# Patient Record
Sex: FEMALE | Race: BLACK OR AFRICAN AMERICAN | Employment: OTHER | URBAN - METROPOLITAN AREA
[De-identification: names, ages, dates, MRNs, and addresses within clinical notes are randomized per-mention and may not be internally consistent; named-entity substitution may affect disease eponyms.]

---

## 2021-06-30 ENCOUNTER — APPOINTMENT (OUTPATIENT)
Dept: GENERAL RADIOLOGY | Age: 66
DRG: 871 | End: 2021-06-30
Attending: NURSE PRACTITIONER
Payer: MEDICARE

## 2021-06-30 ENCOUNTER — APPOINTMENT (OUTPATIENT)
Dept: CT IMAGING | Age: 66
DRG: 871 | End: 2021-06-30
Attending: NURSE PRACTITIONER
Payer: MEDICARE

## 2021-06-30 ENCOUNTER — HOSPITAL ENCOUNTER (INPATIENT)
Age: 66
LOS: 13 days | Discharge: HOME HEALTH | DRG: 871 | End: 2021-07-13
Attending: STUDENT IN AN ORGANIZED HEALTH CARE EDUCATION/TRAINING PROGRAM | Admitting: FAMILY MEDICINE
Payer: MEDICARE

## 2021-06-30 DIAGNOSIS — N17.9 ACUTE KIDNEY INJURY (HCC): ICD-10-CM

## 2021-06-30 DIAGNOSIS — A41.9 SEVERE SEPSIS (HCC): Primary | ICD-10-CM

## 2021-06-30 DIAGNOSIS — N39.0 URINARY TRACT INFECTION WITH HEMATURIA, SITE UNSPECIFIED: ICD-10-CM

## 2021-06-30 DIAGNOSIS — R41.0 DELIRIUM: ICD-10-CM

## 2021-06-30 DIAGNOSIS — R65.20 SEVERE SEPSIS (HCC): Primary | ICD-10-CM

## 2021-06-30 DIAGNOSIS — R31.9 URINARY TRACT INFECTION WITH HEMATURIA, SITE UNSPECIFIED: ICD-10-CM

## 2021-06-30 LAB
ABO + RH BLD: NORMAL
ALBUMIN SERPL-MCNC: 3 G/DL (ref 3.5–5)
ALBUMIN/GLOB SERPL: 0.6 {RATIO} (ref 1.1–2.2)
ALP SERPL-CCNC: 86 U/L (ref 45–117)
ALT SERPL-CCNC: 20 U/L (ref 12–78)
AMPHET UR QL SCN: NEGATIVE
ANION GAP SERPL CALC-SCNC: 16 MMOL/L (ref 5–15)
APAP SERPL-MCNC: <2 UG/ML (ref 10–30)
APPEARANCE UR: ABNORMAL
AST SERPL-CCNC: 22 U/L (ref 15–37)
BACTERIA URNS QL MICRO: ABNORMAL /HPF
BARBITURATES UR QL SCN: NEGATIVE
BASOPHILS # BLD: 0 K/UL (ref 0–0.1)
BASOPHILS NFR BLD: 0 % (ref 0–1)
BENZODIAZ UR QL: NEGATIVE
BILIRUB SERPL-MCNC: 0.7 MG/DL (ref 0.2–1)
BILIRUB UR QL CFM: NEGATIVE
BLOOD GROUP ANTIBODIES SERPL: NORMAL
BUN SERPL-MCNC: 45 MG/DL (ref 6–20)
BUN/CREAT SERPL: 16 (ref 12–20)
CALCIUM SERPL-MCNC: 10.1 MG/DL (ref 8.5–10.1)
CANNABINOIDS UR QL SCN: NEGATIVE
CHLORIDE SERPL-SCNC: 98 MMOL/L (ref 97–108)
CK SERPL-CCNC: 328 U/L (ref 26–192)
CO2 SERPL-SCNC: 24 MMOL/L (ref 21–32)
COCAINE UR QL SCN: NEGATIVE
COLOR UR: ABNORMAL
COVID-19 RAPID TEST, COVR: NOT DETECTED
CREAT SERPL-MCNC: 2.81 MG/DL (ref 0.55–1.02)
DIFFERENTIAL METHOD BLD: ABNORMAL
DRUG SCRN COMMENT,DRGCM: NORMAL
EOSINOPHIL # BLD: 0 K/UL (ref 0–0.4)
EOSINOPHIL NFR BLD: 0 % (ref 0–7)
EPITH CASTS URNS QL MICRO: ABNORMAL /LPF
ERYTHROCYTE [DISTWIDTH] IN BLOOD BY AUTOMATED COUNT: 14.2 % (ref 11.5–14.5)
ETHANOL SERPL-MCNC: <10 MG/DL
GLOBULIN SER CALC-MCNC: 4.9 G/DL (ref 2–4)
GLUCOSE BLD STRIP.AUTO-MCNC: 248 MG/DL (ref 65–117)
GLUCOSE SERPL-MCNC: 238 MG/DL (ref 65–100)
GLUCOSE UR STRIP.AUTO-MCNC: NEGATIVE MG/DL
HCT VFR BLD AUTO: 34 % (ref 35–47)
HGB BLD-MCNC: 11.1 G/DL (ref 11.5–16)
HGB UR QL STRIP: ABNORMAL
IMM GRANULOCYTES # BLD AUTO: 0.3 K/UL
IMM GRANULOCYTES NFR BLD AUTO: 1 %
INR PPP: 1 (ref 0.9–1.1)
KETONES UR QL STRIP.AUTO: ABNORMAL MG/DL
LACTATE SERPL-SCNC: 2.4 MMOL/L (ref 0.4–2)
LEUKOCYTE ESTERASE UR QL STRIP.AUTO: ABNORMAL
LIPASE SERPL-CCNC: 55 U/L (ref 73–393)
LYMPHOCYTES # BLD: 0.5 K/UL (ref 0.8–3.5)
LYMPHOCYTES NFR BLD: 2 % (ref 12–49)
MAGNESIUM SERPL-MCNC: 2.1 MG/DL (ref 1.6–2.4)
MCH RBC QN AUTO: 27.3 PG (ref 26–34)
MCHC RBC AUTO-ENTMCNC: 32.6 G/DL (ref 30–36.5)
MCV RBC AUTO: 83.5 FL (ref 80–99)
METHADONE UR QL: NEGATIVE
MONOCYTES # BLD: 1.5 K/UL (ref 0–1)
MONOCYTES NFR BLD: 6 % (ref 5–13)
NEUTS BAND NFR BLD MANUAL: 15 % (ref 0–6)
NEUTS SEG # BLD: 23.1 K/UL (ref 1.8–8)
NEUTS SEG NFR BLD: 76 % (ref 32–75)
NITRITE UR QL STRIP.AUTO: NEGATIVE
NRBC # BLD: 0 K/UL (ref 0–0.01)
NRBC BLD-RTO: 0 PER 100 WBC
OPIATES UR QL: NEGATIVE
PCP UR QL: NEGATIVE
PH UR STRIP: 5.5 [PH] (ref 5–8)
PLATELET # BLD AUTO: 250 K/UL (ref 150–400)
PMV BLD AUTO: 10.3 FL (ref 8.9–12.9)
POTASSIUM SERPL-SCNC: 3.6 MMOL/L (ref 3.5–5.1)
PROT SERPL-MCNC: 7.9 G/DL (ref 6.4–8.2)
PROT UR STRIP-MCNC: 100 MG/DL
PROTHROMBIN TIME: 10.3 SEC (ref 9–11.1)
RBC # BLD AUTO: 4.07 M/UL (ref 3.8–5.2)
RBC #/AREA URNS HPF: ABNORMAL /HPF (ref 0–5)
RBC MORPH BLD: ABNORMAL
SALICYLATES SERPL-MCNC: 4.8 MG/DL (ref 2.8–20)
SERVICE CMNT-IMP: ABNORMAL
SODIUM SERPL-SCNC: 138 MMOL/L (ref 136–145)
SOURCE, COVRS: NORMAL
SP GR UR REFRACTOMETRY: 1.02 (ref 1–1.03)
SPECIMEN EXP DATE BLD: NORMAL
TROPONIN I SERPL-MCNC: <0.05 NG/ML
TSH SERPL DL<=0.05 MIU/L-ACNC: 0.31 UIU/ML (ref 0.36–3.74)
UR CULT HOLD, URHOLD: NORMAL
UROBILINOGEN UR QL STRIP.AUTO: 1 EU/DL (ref 0.2–1)
WBC # BLD AUTO: 25.4 K/UL (ref 3.6–11)
WBC URNS QL MICRO: ABNORMAL /HPF (ref 0–4)

## 2021-06-30 PROCEDURE — 80307 DRUG TEST PRSMV CHEM ANLYZR: CPT

## 2021-06-30 PROCEDURE — 36415 COLL VENOUS BLD VENIPUNCTURE: CPT

## 2021-06-30 PROCEDURE — 87186 SC STD MICRODIL/AGAR DIL: CPT

## 2021-06-30 PROCEDURE — 70450 CT HEAD/BRAIN W/O DYE: CPT

## 2021-06-30 PROCEDURE — 83036 HEMOGLOBIN GLYCOSYLATED A1C: CPT

## 2021-06-30 PROCEDURE — 83735 ASSAY OF MAGNESIUM: CPT

## 2021-06-30 PROCEDURE — 85025 COMPLETE CBC W/AUTO DIFF WBC: CPT

## 2021-06-30 PROCEDURE — 74011250637 HC RX REV CODE- 250/637: Performed by: NURSE PRACTITIONER

## 2021-06-30 PROCEDURE — 82962 GLUCOSE BLOOD TEST: CPT

## 2021-06-30 PROCEDURE — 80179 DRUG ASSAY SALICYLATE: CPT

## 2021-06-30 PROCEDURE — 99285 EMERGENCY DEPT VISIT HI MDM: CPT

## 2021-06-30 PROCEDURE — 83605 ASSAY OF LACTIC ACID: CPT

## 2021-06-30 PROCEDURE — 84443 ASSAY THYROID STIM HORMONE: CPT

## 2021-06-30 PROCEDURE — 74011000258 HC RX REV CODE- 258: Performed by: NURSE PRACTITIONER

## 2021-06-30 PROCEDURE — 74011250636 HC RX REV CODE- 250/636: Performed by: NURSE PRACTITIONER

## 2021-06-30 PROCEDURE — 82077 ASSAY SPEC XCP UR&BREATH IA: CPT

## 2021-06-30 PROCEDURE — 80053 COMPREHEN METABOLIC PANEL: CPT

## 2021-06-30 PROCEDURE — 87086 URINE CULTURE/COLONY COUNT: CPT

## 2021-06-30 PROCEDURE — 87635 SARS-COV-2 COVID-19 AMP PRB: CPT

## 2021-06-30 PROCEDURE — 81001 URINALYSIS AUTO W/SCOPE: CPT

## 2021-06-30 PROCEDURE — 85610 PROTHROMBIN TIME: CPT

## 2021-06-30 PROCEDURE — 87040 BLOOD CULTURE FOR BACTERIA: CPT

## 2021-06-30 PROCEDURE — 71045 X-RAY EXAM CHEST 1 VIEW: CPT

## 2021-06-30 PROCEDURE — 72125 CT NECK SPINE W/O DYE: CPT

## 2021-06-30 PROCEDURE — 82550 ASSAY OF CK (CPK): CPT

## 2021-06-30 PROCEDURE — 86901 BLOOD TYPING SEROLOGIC RH(D): CPT

## 2021-06-30 PROCEDURE — 65610000006 HC RM INTENSIVE CARE

## 2021-06-30 PROCEDURE — 96365 THER/PROPH/DIAG IV INF INIT: CPT

## 2021-06-30 PROCEDURE — 83690 ASSAY OF LIPASE: CPT

## 2021-06-30 PROCEDURE — 84484 ASSAY OF TROPONIN QUANT: CPT

## 2021-06-30 PROCEDURE — 80143 DRUG ASSAY ACETAMINOPHEN: CPT

## 2021-06-30 PROCEDURE — 93005 ELECTROCARDIOGRAM TRACING: CPT

## 2021-06-30 PROCEDURE — 87077 CULTURE AEROBIC IDENTIFY: CPT

## 2021-06-30 PROCEDURE — 84145 PROCALCITONIN (PCT): CPT

## 2021-06-30 RX ORDER — ACETAMINOPHEN 650 MG/1
975 SUPPOSITORY RECTAL
Status: COMPLETED | OUTPATIENT
Start: 2021-06-30 | End: 2021-06-30

## 2021-06-30 RX ADMIN — SODIUM CHLORIDE, POTASSIUM CHLORIDE, SODIUM LACTATE AND CALCIUM CHLORIDE 1000 ML: 600; 310; 30; 20 INJECTION, SOLUTION INTRAVENOUS at 18:55

## 2021-06-30 RX ADMIN — ACETAMINOPHEN 975 MG: 650 SUPPOSITORY RECTAL at 20:00

## 2021-06-30 RX ADMIN — SODIUM CHLORIDE, POTASSIUM CHLORIDE, SODIUM LACTATE AND CALCIUM CHLORIDE 1000 ML: 600; 310; 30; 20 INJECTION, SOLUTION INTRAVENOUS at 18:56

## 2021-06-30 RX ADMIN — CEFTRIAXONE SODIUM 2 G: 2 INJECTION, POWDER, FOR SOLUTION INTRAMUSCULAR; INTRAVENOUS at 21:22

## 2021-06-30 NOTE — ED PROVIDER NOTES
The below HPI was taken from the patient's son, Meg Cordoba. According Meg Cordoba, she went to check in on his mother on Sunday 6/27/21 and noticed that she seemed \"a little bit off\" which she articulates as a little bit restless, confused, and just not acting herself. She also stated that she had a little bit of a cough. He subsequently visited her the 2 following days and reports progression of her bizarre behavior. He states that he went to go visit her today she was completely disoriented and was not making any sense. He states that the patient is normally A&O x4 at baseline and can complete all of her ADLs and IADLs. PMHx includes: breast CA, diabetes, hypertension, Parkinson's disease, and PUD. Past Medical History:   Diagnosis Date    Cancer ([address] 2010, 2015    BREAST right X2    Diabetes ([facility]     TYPE II    Headache     AWOKE WITH HEADACHE TODAY; HAS HEADACHE ALMOST EVERY DAY, SHE STATES; RELIEVED BY TYLENOL.     Hypertension     Parkinsonism ([address]     Psychiatric disorder     TAKES RISPERIDONE, PT STATES, FOR \"MOOD\"    PUD (peptic ulcer disease)     peptic ulcer; no bleeding    Shortness of breath        Past Surgical History:   Procedure Laterality Date    HX BREAST LUMPECTOMY Left 2010, 2016     BREAST    HX BREAST RECONSTRUCTION Left 6/8/2016    REMOVE TISSUE EXPANDER,PLACEMENT OF IMPLANT LEFT BREAST,REVISE LEFT RECONSTRUCTED BREAST,LIPOSUCTION OF LEFT BREAST, RIGHT MASTOPEXY FOR SYMMETRY performed by Savana Portillo MD at [address] [place] [address] HX BREAST RECONSTRUCTION Left 10/5/2016    LEFT NIPPLE AEROLA COMPLEX RECONSTRUCTION WITH LEFT GROIN SKIN GRAFT, EXCISION TISSUE LEFT BREAST  performed by Savana Portillo MD at [place] [address]    HX HYSTERECTOMY      TOTAL    HX OTHER SURGICAL      SEBACEOUS CYST REMOVAL UPPER BACK         Family History:   Problem Relation Age of Onset    Heart Disease Mother     Pacemaker Mother     Alzheimer Father     No Known Problems Sister    [facility] Stroke Brother     No Known Problems Sister     No Known Problems Sister     No Known Problems Sister     No Known Problems Sister     No Known Problems Brother     No Known Problems Brother     Anesth Problems Neg Hx        Social History     Socioeconomic History    Marital status:      Spouse name: Not on file    Number of children: Not on file    Years of education: Not on file    Highest education level: Not on file   Occupational History    Not on file   Tobacco Use    Smoking status: Former Smoker    Smokeless tobacco: Never Used    Tobacco comment: WAS OCC. SMOKER WHEN SMOKED; QUIT 5 YEARS AGO OR SO, SHE STATES ON 9/21/16. Substance and Sexual Activity    Alcohol use: Yes     Comment: socially    Drug use: No    Sexual activity: Not on file   Other Topics Concern    Not on file   Social History Narrative    Not on file     Social Determinants of Health     Financial Resource Strain:     Difficulty of Paying Living Expenses:    Food Insecurity:     Worried About Running Out of Food in the Last Year:     [address] in the Last Year:    Transportation Needs:     Lack of Transportation (Medical):  Lack of Transportation (Non-Medical):    Physical Activity:     Days of Exercise per Week:     Minutes of Exercise per Session:    Stress:     Feeling of Stress :    Social Connections:     Frequency of Communication with Friends and Family:     Frequency of Social Gatherings with Friends and Family:     Attends Nondenominational Services:     Active Member of Clubs or Organizations:     Attends Club or Organization Meetings:     Marital Status:    Intimate Partner Violence:     Fear of Current or Ex-Partner:     Emotionally Abused:     Physically Abused:     Sexually Abused: ALLERGIES: Patient has no known allergies.     Review of Systems   Unable to perform ROS: Mental status change       Vitals:    06/30/21 1836 06/30/21 1838 06/30/21 1845   BP:  (!) 144/92 Pulse:  (!) 110 (!) 112   Resp:  19 (!) 33   Temp: (!) 102 °F (38.9 °C)     SpO2:  96% 96%            Physical Exam  Vitals and nursing note reviewed. Constitutional:       Appearance: Normal appearance. She is obese. She is ill-appearing. She is not diaphoretic. HENT:      Head: Normocephalic and atraumatic. Right Ear: External ear normal.      Left Ear: External ear normal.      Nose: Nose normal.      Mouth/Throat:      Mouth: Mucous membranes are dry. Pharynx: Oropharynx is clear. Eyes:      General: No scleral icterus. Extraocular Movements: Extraocular movements intact. Conjunctiva/sclera: Conjunctivae normal.      Pupils: Pupils are equal, round, and reactive to light. Neck:      Comments: No grimacing or objective signs of pain from passive neck movment  Cardiovascular:      Rate and Rhythm: Regular rhythm. Tachycardia present. Pulses: Normal pulses. Radial pulses are 2+ on the right side and 2+ on the left side. Heart sounds: Murmur heard. Pulmonary:      Effort: Tachypnea present. No respiratory distress. Breath sounds: Decreased breath sounds present. Abdominal:      General: Abdomen is flat. Bowel sounds are normal. There is no distension. Palpations: Abdomen is soft. Tenderness: There is no abdominal tenderness. There is no guarding or rebound. Musculoskeletal:         General: Normal range of motion. Cervical back: Normal range of motion and neck supple. No edema, erythema or rigidity. Right lower leg: No edema. Left lower leg: No edema. Skin:     General: Skin is dry. Coloration: Skin is not pale. Comments: Skin is hot to touch   Neurological:      Mental Status: She is lethargic and confused. Psychiatric:         Mood and Affect: Mood normal.         Behavior: Behavior normal.         Thought Content:  Thought content normal.         Judgment: Judgment normal.          MDM      VITAL SIGNS:  Patient Vitals for the past 4 hrs:   Temp Pulse Resp BP SpO2   06/30/21 2029  100 20 107/68 97 %   06/30/21 1908 (!) 101.8 °F (38.8 °C)       06/30/21 1845  (!) 112 (!) 33  96 %   06/30/21 1838  (!) 110 19 (!) 144/92 96 %   06/30/21 1836 (!) 102 °F (38.9 °C)             LABS:  Recent Results (from the past 6 hour(s))   EKG, 12 LEAD, INITIAL    Collection Time: 06/30/21  6:42 PM   Result Value Ref Range    Ventricular Rate 110 BPM    Atrial Rate 110 BPM    P-R Interval 144 ms    QRS Duration 76 ms    Q-T Interval 254 ms    QTC Calculation (Bezet) 343 ms    Calculated P Axis 53 degrees    Calculated R Axis 3 degrees    Calculated T Axis 93 degrees    Diagnosis       Sinus tachycardia  Nonspecific T wave abnormality  Abnormal ECG  When compared with ECG of 21-SEP-2016 10:52,  No significant change was found     GLUCOSE, POC    Collection Time: 06/30/21  6:55 PM   Result Value Ref Range    Glucose (POC) 248 (H) 65 - 117 mg/dL    Performed by Nasreen Ramirez    CBC WITH AUTOMATED DIFF    Collection Time: 06/30/21  6:58 PM   Result Value Ref Range    WBC 25.4 (H) 3.6 - 11.0 K/uL    RBC 4.07 3.80 - 5.20 M/uL    HGB 11.1 (L) 11.5 - 16.0 g/dL    HCT 34.0 (L) 35.0 - 47.0 %    MCV 83.5 80.0 - 99.0 FL    MCH 27.3 26.0 - 34.0 PG    MCHC 32.6 30.0 - 36.5 g/dL    RDW 14.2 11.5 - 14.5 %    PLATELET 861 635 - 234 K/uL    MPV 10.3 8.9 - 12.9 FL    NRBC 0.0 0  WBC    ABSOLUTE NRBC 0.00 0.00 - 0.01 K/uL    NEUTROPHILS PENDING %    LYMPHOCYTES PENDING %    MONOCYTES PENDING %    EOSINOPHILS PENDING %    BASOPHILS PENDING %    IMMATURE GRANULOCYTES PENDING %    ABS. NEUTROPHILS PENDING K/UL    ABS. LYMPHOCYTES PENDING K/UL    ABS. MONOCYTES PENDING K/UL    ABS. EOSINOPHILS PENDING K/UL    ABS. BASOPHILS PENDING K/UL    ABS. IMM. GRANS.  PENDING K/UL    DF PENDING    METABOLIC PANEL, COMPREHENSIVE    Collection Time: 06/30/21  6:58 PM   Result Value Ref Range    Sodium 138 136 - 145 mmol/L    Potassium 3.6 3.5 - 5.1 mmol/L Chloride 98 97 - 108 mmol/L    CO2 24 21 - 32 mmol/L    Anion gap 16 (H) 5 - 15 mmol/L    Glucose 238 (H) 65 - 100 mg/dL    BUN 45 (H) 6 - 20 MG/DL    Creatinine 2.81 (H) 0.55 - 1.02 MG/DL    BUN/Creatinine ratio 16 12 - 20      GFR est AA 20 (L) >60 ml/min/1.73m2    GFR est non-AA 17 (L) >60 ml/min/1.73m2    Calcium 10.1 8.5 - 10.1 MG/DL    Bilirubin, total 0.7 0.2 - 1.0 MG/DL    ALT (SGPT) 20 12 - 78 U/L    AST (SGOT) 22 15 - 37 U/L    Alk. phosphatase 86 45 - 117 U/L    Protein, total 7.9 6.4 - 8.2 g/dL    Albumin 3.0 (L) 3.5 - 5.0 g/dL    Globulin 4.9 (H) 2.0 - 4.0 g/dL    A-G Ratio 0.6 (L) 1.1 - 2.2     URINALYSIS W/MICROSCOPIC    Collection Time: 06/30/21  6:58 PM   Result Value Ref Range    Color YELLOW/STRAW      Appearance CLOUDY (A) CLEAR      Specific gravity 1.025 1.003 - 1.030      pH (UA) 5.5 5.0 - 8.0      Protein 100 (A) NEG mg/dL    Glucose Negative NEG mg/dL    Ketone TRACE (A) NEG mg/dL    Blood LARGE (A) NEG      Urobilinogen 1.0 0.2 - 1.0 EU/dL    Nitrites Negative NEG      Leukocyte Esterase MODERATE (A) NEG      WBC  0 - 4 /hpf    RBC 0-5 0 - 5 /hpf    Epithelial cells FEW FEW /lpf    Bacteria 3+ (A) NEG /hpf   URINE CULTURE HOLD SAMPLE    Collection Time: 06/30/21  6:58 PM    Specimen: Serum; Urine   Result Value Ref Range    Urine culture hold        Urine on hold in Microbiology dept for 2 days. If unpreserved urine is submitted, it cannot be used for addtional testing after 24 hours, recollection will be required.    PROTHROMBIN TIME + INR    Collection Time: 06/30/21  6:58 PM   Result Value Ref Range    INR 1.0 0.9 - 1.1      Prothrombin time 10.3 9.0 - 11.1 sec   TROPONIN I    Collection Time: 06/30/21  6:58 PM   Result Value Ref Range    Troponin-I, Qt. <0.05 <0.05 ng/mL   ETHYL ALCOHOL    Collection Time: 06/30/21  6:58 PM   Result Value Ref Range    ALCOHOL(ETHYL),SERUM <10 <10 MG/DL   MAGNESIUM    Collection Time: 06/30/21  6:58 PM   Result Value Ref Range    Magnesium 2.1 1.6 - 2.4 mg/dL   SALICYLATE    Collection Time: 06/30/21  6:58 PM   Result Value Ref Range    Salicylate level 4.8 2.8 - 20.0 MG/DL   ACETAMINOPHEN    Collection Time: 06/30/21  6:58 PM   Result Value Ref Range    Acetaminophen level <2 (L) 10 - 30 ug/mL   LACTIC ACID    Collection Time: 06/30/21  6:58 PM   Result Value Ref Range    Lactic acid 2.4 (HH) 0.4 - 2.0 MMOL/L   LIPASE    Collection Time: 06/30/21  6:58 PM   Result Value Ref Range    Lipase 55 (L) 73 - 393 U/L   TSH 3RD GENERATION    Collection Time: 06/30/21  6:58 PM   Result Value Ref Range    TSH 0.31 (L) 0.36 - 3.74 uIU/mL   BILIRUBIN, CONFIRM    Collection Time: 06/30/21  6:58 PM   Result Value Ref Range    Bilirubin UA, confirm Negative NEG     COVID-19 RAPID TEST    Collection Time: 06/30/21  7:06 PM   Result Value Ref Range    Specimen source Nasopharyngeal      COVID-19 rapid test Not detected NOTD          IMAGING:  CT HEAD WO CONT   Final Result   No evidence of acute process. CT SPINE CERV WO CONT   Final Result   No evidence of acute process. XR CHEST PORT   Final Result   No acute cardiopulmonary process. Medications During Visit:  Medications   cefTRIAXone (ROCEPHIN) 2 g in 0.9% sodium chloride (MBP/ADV) 50 mL MBP (has no administration in time range)   lactated ringers bolus infusion 1,000 mL (1,000 mL IntraVENous New Bag 6/30/21 1856)   lactated ringers bolus infusion 1,000 mL (1,000 mL IntraVENous New Bag 6/30/21 1855)   acetaminophen (TYLENOL) suppository 975 mg (975 mg Rectal Given 6/30/21 2000)         DECISION MAKING:  Marcelle Shane is a 74 y.o. female who comes in as above. Work-up significant for marked leukocytosis, lactic acidosis, and evidence of sepsis secondary to UTI. Patient remains normotensive and still has 2 L IVF infusing. 1 g ceftriaxone given and UC pending. Negative chest x-ray + CT scan of head and neck. Will admit to medicine for further management.     Perfect Serve Consult for Admission  8:27 PM    ED Room Number: SER08/08  Patient Name and age:  Bertha Everett 74 y.o.  female  Working Diagnosis:   1. Severe sepsis ([facility]    2. Acute kidney injury ([facility]    3. Delirium    4. Urinary tract infection with hematuria, site unspecified        COVID-19 Suspicion:  no  Sepsis present:  yes  Reassessment needed: yes  Code Status:  Full Code  Readmission: no  Isolation Requirements:  no  Recommended Level of Care:  step down  Department:[facility] Adult ED - 21   Other: Severe sepsis secondary to UTI and delirium      IMPRESSION:  1. Severe sepsis ([facility] [address]    2. Acute kidney injury ([facility] [address]    3. Delirium    4. Urinary tract infection with hematuria, site unspecified          DISPOSITION:  Admitted    I have spent 30 minutes of critical care time involved in lab review, consultations with specialist, family decision- making, bedside attention and documentation. During this entire length of time I was immediately available to the patient .     Anitra Hay NP

## 2021-06-30 NOTE — Clinical Note
Status[de-identified] INPATIENT [101]   Type of Bed: Intensive Care [6]   Cardiac Monitoring Required?: No   Inpatient Hospitalization Certified Necessary for the Following Reasons: 3.  Patient receiving treatment that can only be provided in an inpatient setting (further clarification in H&P documentation)   Admitting Diagnosis: Sepsis [facility]) [[id number]]   Admitting Physician: Shemar Villa [[id number]]   Attending Physician: Shemar Villa [[id number]]   Estimated Length of Stay: 2 Midnights   Discharge Plan[de-identified] Home with Office Follow-up

## 2021-06-30 NOTE — ED TRIAGE NOTES
Patient arrives via EMS. Per EMS son states patient has been disoriented since Monday. Patient has not had anything to eat or drink in two days. Hx of DM and Hypertension    .

## 2021-07-01 LAB
ALBUMIN SERPL-MCNC: 2.4 G/DL (ref 3.5–5)
ALBUMIN/GLOB SERPL: 0.5 {RATIO} (ref 1.1–2.2)
ALP SERPL-CCNC: 106 U/L (ref 45–117)
ALT SERPL-CCNC: 20 U/L (ref 12–78)
ANION GAP SERPL CALC-SCNC: 6 MMOL/L (ref 5–15)
AST SERPL-CCNC: 22 U/L (ref 15–37)
ATRIAL RATE: 110 BPM
BILIRUB SERPL-MCNC: 0.5 MG/DL (ref 0.2–1)
BUN SERPL-MCNC: 43 MG/DL (ref 6–20)
BUN/CREAT SERPL: 17 (ref 12–20)
CALCIUM SERPL-MCNC: 9.9 MG/DL (ref 8.5–10.1)
CALCULATED P AXIS, ECG09: 53 DEGREES
CALCULATED R AXIS, ECG10: 3 DEGREES
CALCULATED T AXIS, ECG11: 93 DEGREES
CHLORIDE SERPL-SCNC: 102 MMOL/L (ref 97–108)
CO2 SERPL-SCNC: 29 MMOL/L (ref 21–32)
CREAT SERPL-MCNC: 2.56 MG/DL (ref 0.55–1.02)
DIAGNOSIS, 93000: NORMAL
EST. AVERAGE GLUCOSE BLD GHB EST-MCNC: 148 MG/DL
GLOBULIN SER CALC-MCNC: 4.8 G/DL (ref 2–4)
GLUCOSE BLD STRIP.AUTO-MCNC: 134 MG/DL (ref 65–117)
GLUCOSE BLD STRIP.AUTO-MCNC: 159 MG/DL (ref 65–117)
GLUCOSE SERPL-MCNC: 216 MG/DL (ref 65–100)
HBA1C MFR BLD: 6.8 % (ref 4–5.6)
LACTATE SERPL-SCNC: 1.6 MMOL/L (ref 0.4–2)
P-R INTERVAL, ECG05: 144 MS
POTASSIUM SERPL-SCNC: 3.4 MMOL/L (ref 3.5–5.1)
PROCALCITONIN SERPL-MCNC: 14.85 NG/ML
PROT SERPL-MCNC: 7.2 G/DL (ref 6.4–8.2)
Q-T INTERVAL, ECG07: 254 MS
QRS DURATION, ECG06: 76 MS
QTC CALCULATION (BEZET), ECG08: 343 MS
SERVICE CMNT-IMP: ABNORMAL
SERVICE CMNT-IMP: ABNORMAL
SODIUM SERPL-SCNC: 137 MMOL/L (ref 136–145)
VENTRICULAR RATE, ECG03: 110 BPM

## 2021-07-01 PROCEDURE — 74011636637 HC RX REV CODE- 636/637: Performed by: HOSPITALIST

## 2021-07-01 PROCEDURE — 74011250637 HC RX REV CODE- 250/637: Performed by: HOSPITALIST

## 2021-07-01 PROCEDURE — 97116 GAIT TRAINING THERAPY: CPT

## 2021-07-01 PROCEDURE — 83605 ASSAY OF LACTIC ACID: CPT

## 2021-07-01 PROCEDURE — 97165 OT EVAL LOW COMPLEX 30 MIN: CPT

## 2021-07-01 PROCEDURE — 74011250636 HC RX REV CODE- 250/636: Performed by: STUDENT IN AN ORGANIZED HEALTH CARE EDUCATION/TRAINING PROGRAM

## 2021-07-01 PROCEDURE — 74011000258 HC RX REV CODE- 258: Performed by: HOSPITALIST

## 2021-07-01 PROCEDURE — 82962 GLUCOSE BLOOD TEST: CPT

## 2021-07-01 PROCEDURE — 36415 COLL VENOUS BLD VENIPUNCTURE: CPT

## 2021-07-01 PROCEDURE — 74011250637 HC RX REV CODE- 250/637: Performed by: STUDENT IN AN ORGANIZED HEALTH CARE EDUCATION/TRAINING PROGRAM

## 2021-07-01 PROCEDURE — 97535 SELF CARE MNGMENT TRAINING: CPT

## 2021-07-01 PROCEDURE — 65660000001 HC RM ICU INTERMED STEPDOWN

## 2021-07-01 PROCEDURE — 74011250636 HC RX REV CODE- 250/636: Performed by: HOSPITALIST

## 2021-07-01 PROCEDURE — 80053 COMPREHEN METABOLIC PANEL: CPT

## 2021-07-01 PROCEDURE — 97161 PT EVAL LOW COMPLEX 20 MIN: CPT

## 2021-07-01 RX ORDER — POLYETHYLENE GLYCOL 3350 17 G/17G
17 POWDER, FOR SOLUTION ORAL DAILY PRN
Status: DISCONTINUED | OUTPATIENT
Start: 2021-07-01 | End: 2021-07-13 | Stop reason: HOSPADM

## 2021-07-01 RX ORDER — MAGNESIUM SULFATE 100 %
4 CRYSTALS MISCELLANEOUS AS NEEDED
Status: DISCONTINUED | OUTPATIENT
Start: 2021-07-01 | End: 2021-07-13 | Stop reason: HOSPADM

## 2021-07-01 RX ORDER — HEPARIN SODIUM 5000 [USP'U]/ML
5000 INJECTION, SOLUTION INTRAVENOUS; SUBCUTANEOUS EVERY 8 HOURS
Status: DISCONTINUED | OUTPATIENT
Start: 2021-07-01 | End: 2021-07-13 | Stop reason: HOSPADM

## 2021-07-01 RX ORDER — RISPERIDONE 1 MG/1
3 TABLET, FILM COATED ORAL 2 TIMES DAILY
Status: DISCONTINUED | OUTPATIENT
Start: 2021-07-01 | End: 2021-07-02

## 2021-07-01 RX ORDER — INSULIN LISPRO 100 [IU]/ML
INJECTION, SOLUTION INTRAVENOUS; SUBCUTANEOUS
Status: DISCONTINUED | OUTPATIENT
Start: 2021-07-01 | End: 2021-07-13 | Stop reason: HOSPADM

## 2021-07-01 RX ORDER — BENZTROPINE MESYLATE 1 MG/1
0.5 TABLET ORAL DAILY
Status: DISCONTINUED | OUTPATIENT
Start: 2021-07-01 | End: 2021-07-02

## 2021-07-01 RX ORDER — ACETAMINOPHEN 325 MG/1
650 TABLET ORAL
Status: DISCONTINUED | OUTPATIENT
Start: 2021-07-01 | End: 2021-07-13 | Stop reason: HOSPADM

## 2021-07-01 RX ORDER — DEXTROSE 50 % IN WATER (D50W) INTRAVENOUS SYRINGE
12.5-25 AS NEEDED
Status: DISCONTINUED | OUTPATIENT
Start: 2021-07-01 | End: 2021-07-13 | Stop reason: HOSPADM

## 2021-07-01 RX ORDER — ONDANSETRON 4 MG/1
4 TABLET, ORALLY DISINTEGRATING ORAL
Status: DISCONTINUED | OUTPATIENT
Start: 2021-07-01 | End: 2021-07-13 | Stop reason: HOSPADM

## 2021-07-01 RX ORDER — ONDANSETRON 2 MG/ML
4 INJECTION INTRAMUSCULAR; INTRAVENOUS
Status: DISCONTINUED | OUTPATIENT
Start: 2021-07-01 | End: 2021-07-13 | Stop reason: HOSPADM

## 2021-07-01 RX ORDER — SODIUM CHLORIDE 9 MG/ML
50 INJECTION, SOLUTION INTRAVENOUS CONTINUOUS
Status: DISCONTINUED | OUTPATIENT
Start: 2021-07-01 | End: 2021-07-07

## 2021-07-01 RX ORDER — SODIUM CHLORIDE 0.9 % (FLUSH) 0.9 %
5-40 SYRINGE (ML) INJECTION AS NEEDED
Status: DISCONTINUED | OUTPATIENT
Start: 2021-07-01 | End: 2021-07-13 | Stop reason: HOSPADM

## 2021-07-01 RX ORDER — POTASSIUM CHLORIDE 750 MG/1
40 TABLET, FILM COATED, EXTENDED RELEASE ORAL
Status: COMPLETED | OUTPATIENT
Start: 2021-07-01 | End: 2021-07-01

## 2021-07-01 RX ORDER — ACETAMINOPHEN 650 MG/1
650 SUPPOSITORY RECTAL
Status: DISCONTINUED | OUTPATIENT
Start: 2021-07-01 | End: 2021-07-13 | Stop reason: HOSPADM

## 2021-07-01 RX ORDER — INSULIN GLARGINE 100 [IU]/ML
15 INJECTION, SOLUTION SUBCUTANEOUS
Status: DISCONTINUED | OUTPATIENT
Start: 2021-07-01 | End: 2021-07-13 | Stop reason: HOSPADM

## 2021-07-01 RX ORDER — SODIUM CHLORIDE 0.9 % (FLUSH) 0.9 %
5-40 SYRINGE (ML) INJECTION EVERY 8 HOURS
Status: DISCONTINUED | OUTPATIENT
Start: 2021-07-01 | End: 2021-07-13 | Stop reason: HOSPADM

## 2021-07-01 RX ORDER — ATORVASTATIN CALCIUM 10 MG/1
10 TABLET, FILM COATED ORAL
Status: DISCONTINUED | OUTPATIENT
Start: 2021-07-01 | End: 2021-07-13 | Stop reason: HOSPADM

## 2021-07-01 RX ADMIN — CEFTRIAXONE SODIUM 2 G: 2 INJECTION, POWDER, FOR SOLUTION INTRAMUSCULAR; INTRAVENOUS at 20:19

## 2021-07-01 RX ADMIN — Medication 1 CAPSULE: at 10:16

## 2021-07-01 RX ADMIN — POTASSIUM CHLORIDE 40 MEQ: 750 TABLET, FILM COATED, EXTENDED RELEASE ORAL at 10:15

## 2021-07-01 RX ADMIN — INSULIN GLARGINE 15 UNITS: 100 INJECTION, SOLUTION SUBCUTANEOUS at 22:14

## 2021-07-01 RX ADMIN — HEPARIN SODIUM 5000 UNITS: 5000 INJECTION INTRAVENOUS; SUBCUTANEOUS at 18:00

## 2021-07-01 RX ADMIN — Medication 10 ML: at 05:14

## 2021-07-01 RX ADMIN — INSULIN LISPRO 3 UNITS: 100 INJECTION, SOLUTION INTRAVENOUS; SUBCUTANEOUS at 10:14

## 2021-07-01 RX ADMIN — SODIUM CHLORIDE 150 ML/HR: 9 INJECTION, SOLUTION INTRAVENOUS at 05:13

## 2021-07-01 RX ADMIN — RISPERIDONE 3 MG: 1 TABLET ORAL at 17:57

## 2021-07-01 RX ADMIN — SODIUM CHLORIDE 150 ML/HR: 9 INJECTION, SOLUTION INTRAVENOUS at 18:30

## 2021-07-01 RX ADMIN — HEPARIN SODIUM 5000 UNITS: 5000 INJECTION INTRAVENOUS; SUBCUTANEOUS at 05:13

## 2021-07-01 RX ADMIN — Medication 10 ML: at 22:15

## 2021-07-01 RX ADMIN — ATORVASTATIN CALCIUM 10 MG: 10 TABLET, FILM COATED ORAL at 05:13

## 2021-07-01 RX ADMIN — Medication 10 ML: at 17:58

## 2021-07-01 RX ADMIN — SODIUM CHLORIDE 150 ML/HR: 9 INJECTION, SOLUTION INTRAVENOUS at 12:50

## 2021-07-01 RX ADMIN — RISPERIDONE 3 MG: 1 TABLET ORAL at 10:15

## 2021-07-01 RX ADMIN — ACETAMINOPHEN 650 MG: 325 TABLET ORAL at 18:59

## 2021-07-01 RX ADMIN — BENZTROPINE MESYLATE 0.5 MG: 1 TABLET ORAL at 10:15

## 2021-07-01 RX ADMIN — HEPARIN SODIUM 5000 UNITS: 5000 INJECTION INTRAVENOUS; SUBCUTANEOUS at 11:42

## 2021-07-01 NOTE — PROGRESS NOTES
Problem: Mobility Impaired (Adult and Pediatric)  Goal: *Acute Goals and Plan of Care (Insert Text)  Description: FUNCTIONAL STATUS PRIOR TO ADMISSION: Patient deemed a poor historian at the time of eval. She reported that she was independent and active without use of DME.    HOME SUPPORT PRIOR TO ADMISSION: The patient lived alone and has a son locally. It is unclear how much support he is able to provide upon discharge. [facility] Physical Therapy Goals  Initiated 7/1/2021  1. Patient will move from supine to sit and sit to supine , scoot up and down, and roll side to side in bed with independence within 7 day(s). 2.  Patient will transfer from bed to chair and chair to bed with independence using the least restrictive device within 7 day(s). 3.  Patient will perform sit to stand with independence within 7 day(s). 4.  Patient will ambulate with independence for 300 feet with the least restrictive device within 7 day(s). 5.  Patient will ascend/descend 12 stairs with one handrail(s) with modified independence within 7 day(s). Outcome: Progressing Towards Goal   PHYSICAL THERAPY EVALUATION  Patient: Dee Marion (17 y.o. female)  Date: 7/1/2021  Primary Diagnosis: Sepsis ([facility] [A41.9]        Precautions:   Fall, Bed Alarm    ASSESSMENT  Based on the objective data described below, the patient presents with confusion, (oriented X 2, see below), distractibility, impulsiveness, decreased awareness of the environment (required ongoing cues to allow for safe management of the iv line), and irritability with education about safety measures required, especially in the bathroom (see OT note). Primary assistance provided today was for iv line management. Anticipate steady mobility progression. Primary concern is regarding cognition (pt admitted with severe sepsis secondary to acute cystitis) and hopefully cognition will hopefully improve with medical management of the sepsis.       Vitals:         07/01/21 1156 07/01/21 1213   BP:     131/81 108/70   BP 1 Location:     Left upper arm Right arm   BP Patient Position:     Supine;Semi fowlers Lying left side post amb   Pulse:     100 (!) 108   Temp:           Resp:     18 22   Weight:           SpO2: on room air     99% 96%                Current Level of Function Impacting Discharge (mobility/balance): stand by assist    Functional Outcome Measure: The patient scored 12 (extrapolated)  on the TUG outcome measure which is indicative of increased fall risk. .      Other factors to consider for discharge: lives alone     Patient will benefit from skilled therapy intervention to address the above noted impairments. PLAN :  Recommendations and Planned Interventions: bed mobility training, transfer training, gait training, therapeutic exercises, patient and family training/education, and therapeutic activities      Frequency/Duration: Patient will be followed by physical therapy:  4 times a week to address goals. Recommendation for discharge: (in order for the patient to meet his/her long term goals)  To be determined: pending progress, Home with HHPT and supervision vs rehab vs none    This discharge recommendation:  A follow-up discussion with the attending provider and/or case management is planned    IF patient discharges home will need the following DME: to be determined (TBD), anticipate none at this time. SUBJECTIVE:   Patient stated that she had to use the bathroom (as the eval was initiated). OBJECTIVE DATA SUMMARY:   Consult received, chart reviewed, pt cleared by nursing  HISTORY:    Past Medical History:   Diagnosis Date    Cancer ([facility] 2010, 2015    BREAST right X2    Diabetes ([facility]     TYPE II    Headache     AWOKE WITH HEADACHE TODAY; HAS HEADACHE ALMOST EVERY DAY, SHE STATES; RELIEVED BY TYLENOL.     Hypertension     Parkinsonism ([address]     Psychiatric disorder     TAKES RISPERIDONE, PT STATES, FOR \"MOOD\"    PUD (peptic ulcer disease) peptic ulcer; no bleeding    Shortness of breath      Past Surgical History:   Procedure Laterality Date    HX BREAST LUMPECTOMY Left 2010, 2016     BREAST    HX BREAST RECONSTRUCTION Left 6/8/2016    REMOVE TISSUE EXPANDER,PLACEMENT OF IMPLANT LEFT BREAST,REVISE LEFT RECONSTRUCTED BREAST,LIPOSUCTION OF LEFT BREAST, RIGHT MASTOPEXY FOR SYMMETRY performed by Lucian Lutz MD at [address] HX BREAST RECONSTRUCTION Left 10/5/2016    LEFT NIPPLE AEROLA COMPLEX RECONSTRUCTION WITH LEFT GROIN SKIN GRAFT, EXCISION TISSUE LEFT BREAST  performed by Lucian Lutz MD at [address] HX HYSTERECTOMY      TOTAL    HX OTHER SURGICAL      SEBACEOUS CYST REMOVAL UPPER BACK       Personal factors and/or comorbidities impacting plan of care: lives alone    Home Situation  Home Environment: Private residence  # Steps to Enter:  (patient does not know)  One/Two Story Residence: Two story  # of Interior Steps:  (12?)  Living Alone: Yes  Support Systems: Child(ruiz) (son lives nearby)  Current DME Used/Available at Home: Grab bars, Walker (questionable)  Tub or Shower Type: Shower    EXAMINATION/PRESENTATION/DECISION MAKING:   Critical Behavior:  Neurologic State: Alert  Orientation Level: Oriented to person, Oriented to place, Disoriented to situation, Disoriented to time  Cognition: Decreased attention/concentration, Impulsive, Poor safety awareness, Impaired decision making  Safety/Judgement: Decreased insight into deficits, Decreased awareness of need for safety, Decreased awareness of need for assistance  Hearing:     Skin:  refer to MD and nursing notes  Edema: none noted  Range Of Motion:  AROM: Generally decreased, functional                       Strength:    Strength: Generally decreased, functional                    Tone & Sensation:                  Sensation: Impaired (at baseline in feet)               Coordination:     Vision:   Corrective Lenses: Reading glasses  Functional Mobility:  Bed Mobility:     Supine to Sit: Stand-by assistance; Additional time;Bed Modified  Sit to Supine: Stand-by assistance     Transfers:  Sit to Stand: Stand-by assistance  Stand to Sit: Stand-by assistance                       Balance:   Sitting: Intact; Without support  Standing: Impaired; Without support  Standing - Static: Good  Standing - Dynamic : Fair  Ambulation/Gait Training:  Distance (ft): 24 Feet (ft) (12 feet X 2)  Assistive Device:  (pt refused gait belt)  Ambulation - Level of Assistance: Stand-by assistance        Gait Abnormalities: Decreased step clearance        Base of Support: Widened     Speed/Jade: Accelerated  Step Length: Left shortened;Right shortened                     Stairs: Therapeutic Exercises:       Functional Measure:  Timed up and go:    Timed Get Up And Go Test: 12 (extrapolated)       < than 10 seconds=Normal  Greater then 13.5 seconds (in elderly)=Increased fall risk   Shawna Rose Woolacott M. Predicting the probability for falls in community dwelling older adults using the Timed Up and Go Test. Phys Ther. 2000;80:896-903.              Physical Therapy Evaluation Charge Determination   History Examination Presentation Decision-Making   HIGH Complexity :3+ comorbidities / personal factors will impact the outcome/ POC  MEDIUM Complexity : 3 Standardized tests and measures addressing body structure, function, activity limitation and / or participation in recreation  MEDIUM Complexity : Evolving with changing characteristics  LOW Complexity : FOTO score of       Based on the above components, the patient evaluation is determined to be of the following complexity level: LOW     Pain Rating:  None rated    Activity Tolerance:   See assessment     After treatment patient left in no apparent distress:   Patient positioned in left sidelying for pressure relief, Call bell within reach, Bed / chair alarm activated, and Side rails x 3    COMMUNICATION/EDUCATION: The patients plan of care was discussed with: Occupational therapist and Registered nurse. If is not fully clear that patient understands intent and goals of therapy.     Thank you for this referral.  Lena Dickens   Time Calculation: 33 mins

## 2021-07-01 NOTE — PROGRESS NOTES
Clinical Pharmacy Note: Ceftriaxone Dosing    Please note that the ceftriaxone dose for Debby Heard has been changed to 2000 mg IV q24h per [facility]-approved protocol. Please contact the pharmacy with any questions.     DAKOTA FletcherD

## 2021-07-01 NOTE — PROGRESS NOTES
Orders received, chart reviewed and patient evaluated by physical therapy. Pending progression with skilled acute physical therapy, recommend: To be determined: pending progress, Home with HHPT and supervision vs rehab vs none    Recommend with nursing once cognition improves OOB to chair 3x/day and walking daily with assist X 1 and no assistive device. While still confused recommend bed to chair position 3 X per day. Thank you for completing as able in order to maintain patient strength, endurance and independence. Full evaluation to follow.  Marilou Branch, PT      Vitals:      07/01/21 1156 07/01/21 1213   BP:   131/81 108/70   BP 1 Location:   Left upper arm Right arm   BP Patient Position:   Supine;Semi fowlers Lying left side post amb   Pulse:   100 (!) 108   Temp:       Resp:   18 22   Weight:       SpO2: on room air   99% 96%

## 2021-07-01 NOTE — PROGRESS NOTES
Physician Progress Note      PATIENT:               Noemy Camp  CSN #:                  [id number]  :                       1955  ADMIT DATE:       2021 6:34 PM  [address] [place] DATE:  RESPONDING  PROVIDER #:        Wendy Youssef MD          QUERY TEXT:    Pt admitted with Sepsis and Acute Cystitis. Pt noted to have AMS and Lethargy. If possible, please document in the progress notes and discharge summary if you are evaluating and / or treating any of the following: The medical record reflects the following:  Risk Factors: admitted with Sepsis and Cystitis, with hx of Parkinson's disease  Clinical Indicators: admitted with lethargy and confusion x 3 days, with documentation of pt not following commands. Treatment: IVF for hydration, Rocephin IV for tx of underlying infection. Thank you, if you have any questions please e-mail me at  [email or web address]  [phone]  Options provided:  -- Metabolic encephalopathy  -- Septic encephalopathy  -- Delirium due to, Please specify cause. -- Other - I will add my own diagnosis  -- Disagree - Not applicable / Not valid  -- Disagree - Clinically unable to determine / Unknown  -- Refer to Clinical Documentation Reviewer    PROVIDER RESPONSE TEXT:    This patient has metabolic encephalopathy.     Query created by: Benedicto Walters on 2021 11:28 AM      Electronically signed by:  Wendy Youssef MD 2021 4:51 PM

## 2021-07-01 NOTE — H&P
History & Physical    Primary Care Provider: Yadira Osullivan MD  Source of Information: Patient and chart review    History of Presenting Illness:   Jessica Toro is a 74 y.o. female w/ hx of NIDDM II, Dyslipidemia, Parkinson's, Mood disorder, PUD who presented to ed for confusion and poor po intake. Patient is seen and examined at bedside. She is alert and oriented to person and place but still mildly confused and provides limited history. States she has had persistent nausea over the last few days as well as lower abdominal pain. Per chart review, son reported that patient over the last 3 days has become increasingly confused. The patient denies any fever, chills, chest pain, cough, congestion, recent illness, palpitations, or dysuria. Vitals on ER presentation are remarkable for Temp 102, HR to 112 and RR to 33. Labs are remarkable for wbc of 25.4, Cr 2.81, Glucose 238, UA showed cloudy urine with Trace Ketone, Large Blood and 3+ Bacteria. CXR, CT Cervical spine and Head CT showed no acute process. Review of Systems:  A comprehensive review of systems was negative except for that written in the History of Present Illness. Past Medical History:   Diagnosis Date    Cancer ([address] 2010, 2015    BREAST right X2    Diabetes ([address]     TYPE II    Headache     AWOKE WITH HEADACHE TODAY; HAS HEADACHE ALMOST EVERY DAY, SHE STATES; RELIEVED BY TYLENOL.     Hypertension     Parkinsonism ([address]     Psychiatric disorder     TAKES RISPERIDONE, PT STATES, FOR \"MOOD\"    PUD (peptic ulcer disease)     peptic ulcer; no bleeding    Shortness of breath       Past Surgical History:   Procedure Laterality Date    HX BREAST LUMPECTOMY Left 2010, 2016     BREAST    HX BREAST RECONSTRUCTION Left 6/8/2016    REMOVE TISSUE EXPANDER,PLACEMENT OF IMPLANT LEFT BREAST,REVISE LEFT RECONSTRUCTED BREAST,LIPOSUCTION OF LEFT BREAST, RIGHT MASTOPEXY FOR SYMMETRY performed by Bianka Cisneros Eloise Kohler MD at [address] HX BREAST RECONSTRUCTION Left 10/5/2016    LEFT NIPPLE AEROLA COMPLEX RECONSTRUCTION WITH LEFT GROIN SKIN GRAFT, EXCISION TISSUE LEFT BREAST  performed by Tracey Lloyd MD at [address] HX HYSTERECTOMY      TOTAL    HX OTHER SURGICAL      SEBACEOUS CYST REMOVAL UPPER BACK     Prior to Admission medications    Medication Sig Start Date End Date Taking? Authorizing Provider   oxyCODONE-acetaminophen (PERCOCET) 5-325 mg per tablet Take 1 Tab by mouth every four (4) hours as needed for Pain. Max Daily Amount: 6 Tabs. 10/5/16   Lupe Bailey MD   promethazine (PHENERGAN) 25 mg tablet Take 1 Tab by mouth every six (6) hours as needed for Nausea. 10/5/16   Lupe Bailey MD   glimepiride (AMARYL) 2 mg tablet Take 1 mg by mouth nightly. Provider, Historical   acetaminophen (TYLENOL) 325 mg tablet Take 650 mg by mouth every four (4) hours as needed for Pain. Provider, Historical   lovastatin (MEVACOR) 20 mg tablet Take 20 mg by mouth nightly. Provider, Historical   risperiDONE (RISPERDAL) 3 mg tablet Take 3 mg by mouth two (2) times a day. Provider, Historical   indapamide (LOZOL) 1.25 mg tablet Take 1.25 mg by mouth daily. Provider, Historical   omeprazole (PRILOSEC) 20 mg capsule Take 20 mg by mouth daily. Provider, Historical   verapamil ER (CALAN-SR) 240 mg CR tablet Take 240 mg by mouth daily. Provider, Historical   benztropine (COGENTIN) 0.5 mg tablet Take 0.5 mg by mouth daily. Indications: PARKINSONISM    Provider, Historical   benztropine (COGENTIN) 0.5 mg tablet Take 1 mg by mouth nightly. Indications: PARKINSONISM    Provider, Historical   glimepiride (AMARYL) 2 mg tablet Take 2 mg by mouth every morning.     Provider, Historical     No Known Allergies   Family History   Problem Relation Age of Onset    Heart Disease Mother     Pacemaker Mother     Alzheimer Father     No Known Problems Sister     Stroke Brother     No Known Problems Sister     No Known Problems Sister     No Known Problems Sister     No Known Problems Sister     No Known Problems Brother     No Known Problems Brother     Anesth Problems Neg Hx         SOCIAL HISTORY:  Patient resides:  Independently x   Assisted Living    SNF    With family care       Smoking history:   None x   Former    Chronic      Alcohol history:   None x   Social    Chronic      Ambulates:   Independently x   w/cane    w/walker    w/wc    CODE STATUS:  DNR    Full x   Other      Objective:     Physical Exam:     Visit Vitals  /78   Pulse 94   Temp 99 °F (37.2 °C)   Resp 16   Wt 98 kg (216 lb 0.8 oz)   SpO2 94%   BMI 38.27 kg/m²           General:  Alert, cooperative, no distress, appears stated age. Head:  Normocephalic, without obvious abnormality, atraumatic. Eyes:  Conjunctivae/corneas clear. PERRL, EOMs intact. Nose: Nares normal. Septum midline. Mucosa normal.        Neck: Supple, symmetrical, trachea midline, no carotid bruit and no JVD. Lungs:   Clear to auscultation bilaterally. Chest wall:  No tenderness or deformity. Heart:  Regular rate and rhythm, S1, S2 normal, no murmur, click, rub or gallop. Abdomen:   Soft, obese abdomen, mild suprapubic tenderness on palpation. . Bowel sounds normal. No masses,  No organomegaly. Extremities: Extremities normal, atraumatic, no cyanosis or edema. Pulses: 2+ and symmetric all extremities. Skin: Skin color, texture, turgor normal. No rashes or lesions   Neurologic: CNII-XII intact. Data Review:     Recent Days:  Recent Labs     06/30/21  1858   WBC 25.4*   HGB 11.1*   HCT 34.0*        Recent Labs     06/30/21  1858      K 3.6   CL 98   CO2 24   *   BUN 45*   CREA 2.81*   CA 10.1   MG 2.1   ALB 3.0*   ALT 20   INR 1.0     No results for input(s): PH, PCO2, PO2, HCO3, FIO2 in the last 72 hours.     24 Hour Results:  Recent Results (from the past 24 hour(s))   EKG, 12 LEAD, INITIAL    Collection Time: 06/30/21  6:42 PM   Result Value Ref Range    Ventricular Rate 110 BPM    Atrial Rate 110 BPM    P-R Interval 144 ms    QRS Duration 76 ms    Q-T Interval 254 ms    QTC Calculation (Bezet) 343 ms    Calculated P Axis 53 degrees    Calculated R Axis 3 degrees    Calculated T Axis 93 degrees    Diagnosis       Sinus tachycardia  Nonspecific T wave abnormality  Abnormal ECG  When compared with ECG of 21-SEP-2016 10:52,  No significant change was found     GLUCOSE, POC    Collection Time: 06/30/21  6:55 PM   Result Value Ref Range    Glucose (POC) 248 (H) 65 - 117 mg/dL    Performed by Maicol Maher    CBC WITH AUTOMATED DIFF    Collection Time: 06/30/21  6:58 PM   Result Value Ref Range    WBC 25.4 (H) 3.6 - 11.0 K/uL    RBC 4.07 3.80 - 5.20 M/uL    HGB 11.1 (L) 11.5 - 16.0 g/dL    HCT 34.0 (L) 35.0 - 47.0 %    MCV 83.5 80.0 - 99.0 FL    MCH 27.3 26.0 - 34.0 PG    MCHC 32.6 30.0 - 36.5 g/dL    RDW 14.2 11.5 - 14.5 %    PLATELET 658 715 - 029 K/uL    MPV 10.3 8.9 - 12.9 FL    NRBC 0.0 0  WBC    ABSOLUTE NRBC 0.00 0.00 - 0.01 K/uL    NEUTROPHILS 76 (H) 32 - 75 %    BAND NEUTROPHILS 15 (H) 0 - 6 %    LYMPHOCYTES 2 (L) 12 - 49 %    MONOCYTES 6 5 - 13 %    EOSINOPHILS 0 0 - 7 %    BASOPHILS 0 0 - 1 %    IMMATURE GRANULOCYTES 1 %    ABS. NEUTROPHILS 23.1 (H) 1.8 - 8.0 K/UL    ABS. LYMPHOCYTES 0.5 (L) 0.8 - 3.5 K/UL    ABS. MONOCYTES 1.5 (H) 0.0 - 1.0 K/UL    ABS. EOSINOPHILS 0.0 0.0 - 0.4 K/UL    ABS. BASOPHILS 0.0 0.0 - 0.1 K/UL    ABS. IMM.  GRANS. 0.3 K/UL    DF MANUAL      RBC COMMENTS NORMOCYTIC, NORMOCHROMIC     METABOLIC PANEL, COMPREHENSIVE    Collection Time: 06/30/21  6:58 PM   Result Value Ref Range    Sodium 138 136 - 145 mmol/L    Potassium 3.6 3.5 - 5.1 mmol/L    Chloride 98 97 - 108 mmol/L    CO2 24 21 - 32 mmol/L    Anion gap 16 (H) 5 - 15 mmol/L    Glucose 238 (H) 65 - 100 mg/dL    BUN 45 (H) 6 - 20 MG/DL    Creatinine 2.81 (H) 0.55 - 1.02 MG/DL    BUN/Creatinine ratio 16 12 - 20      GFR est AA 20 (L) >60 ml/min/1.73m2    GFR est non-AA 17 (L) >60 ml/min/1.73m2    Calcium 10.1 8.5 - 10.1 MG/DL    Bilirubin, total 0.7 0.2 - 1.0 MG/DL    ALT (SGPT) 20 12 - 78 U/L    AST (SGOT) 22 15 - 37 U/L    Alk. phosphatase 86 45 - 117 U/L    Protein, total 7.9 6.4 - 8.2 g/dL    Albumin 3.0 (L) 3.5 - 5.0 g/dL    Globulin 4.9 (H) 2.0 - 4.0 g/dL    A-G Ratio 0.6 (L) 1.1 - 2.2     URINALYSIS W/MICROSCOPIC    Collection Time: 06/30/21  6:58 PM   Result Value Ref Range    Color YELLOW/STRAW      Appearance CLOUDY (A) CLEAR      Specific gravity 1.025 1.003 - 1.030      pH (UA) 5.5 5.0 - 8.0      Protein 100 (A) NEG mg/dL    Glucose Negative NEG mg/dL    Ketone TRACE (A) NEG mg/dL    Blood LARGE (A) NEG      Urobilinogen 1.0 0.2 - 1.0 EU/dL    Nitrites Negative NEG      Leukocyte Esterase MODERATE (A) NEG      WBC  0 - 4 /hpf    RBC 0-5 0 - 5 /hpf    Epithelial cells FEW FEW /lpf    Bacteria 3+ (A) NEG /hpf   URINE CULTURE HOLD SAMPLE    Collection Time: 06/30/21  6:58 PM    Specimen: Serum; Urine   Result Value Ref Range    Urine culture hold        Urine on hold in Microbiology dept for 2 days. If unpreserved urine is submitted, it cannot be used for addtional testing after 24 hours, recollection will be required.    PROTHROMBIN TIME + INR    Collection Time: 06/30/21  6:58 PM   Result Value Ref Range    INR 1.0 0.9 - 1.1      Prothrombin time 10.3 9.0 - 11.1 sec   TROPONIN I    Collection Time: 06/30/21  6:58 PM   Result Value Ref Range    Troponin-I, Qt. <0.05 <0.05 ng/mL   ETHYL ALCOHOL    Collection Time: 06/30/21  6:58 PM   Result Value Ref Range    ALCOHOL(ETHYL),SERUM <10 <10 MG/DL   MAGNESIUM    Collection Time: 06/30/21  6:58 PM   Result Value Ref Range    Magnesium 2.1 1.6 - 2.4 mg/dL   SALICYLATE    Collection Time: 06/30/21  6:58 PM   Result Value Ref Range    Salicylate level 4.8 2.8 - 20.0 MG/DL   ACETAMINOPHEN    Collection Time: 06/30/21  6:58 PM   Result Value Ref Range    Acetaminophen level <2 (L) 10 - 30 ug/mL   LACTIC ACID    Collection Time: 06/30/21  6:58 PM   Result Value Ref Range    Lactic acid 2.4 (HH) 0.4 - 2.0 MMOL/L   LIPASE    Collection Time: 06/30/21  6:58 PM   Result Value Ref Range    Lipase 55 (L) 73 - 393 U/L   PROCALCITONIN    Collection Time: 06/30/21  6:58 PM   Result Value Ref Range    Procalcitonin 14.85 ng/mL   TSH 3RD GENERATION    Collection Time: 06/30/21  6:58 PM   Result Value Ref Range    TSH 0.31 (L) 0.36 - 3.74 uIU/mL   CK    Collection Time: 06/30/21  6:58 PM   Result Value Ref Range     (H) 26 - 192 U/L   BILIRUBIN, CONFIRM    Collection Time: 06/30/21  6:58 PM   Result Value Ref Range    Bilirubin UA, confirm Negative NEG     TYPE & SCREEN    Collection Time: 06/30/21  7:05 PM   Result Value Ref Range    Crossmatch Expiration 07/03/2021,2359     ABO/Rh(D) Bridget Marus POSITIVE     Antibody screen NEG    COVID-19 RAPID TEST    Collection Time: 06/30/21  7:06 PM   Result Value Ref Range    Specimen source Nasopharyngeal      COVID-19 rapid test Not detected NOTD     DRUG SCREEN, URINE    Collection Time: 06/30/21  8:30 PM   Result Value Ref Range    AMPHETAMINES Negative NEG      BARBITURATES Negative NEG      BENZODIAZEPINES Negative NEG      COCAINE Negative NEG      METHADONE Negative NEG      OPIATES Negative NEG      PCP(PHENCYCLIDINE) Negative NEG      THC (TH-CANNABINOL) Negative NEG      Drug screen comment (NOTE)          Imaging:     Assessment:     Ashutosh Main is a 74 y.o. female w/ hx of NIDDM II, Dyslipidemia, Parkinson's, Mood disorder, PUD who is admitted for severe sepsis 2/2 acute cystitis.        Plan:       Severe Sepsis 2/2 Acute Cystitis  -Follow blood and urine cultures  -Continue with Rocephin    NIDDM II   -Lantus 10 units plus SSI  -Hypoglycemic protocols    Acute renal failure  -Likely prerenal in setting of IV VD  -Continue MIVF and trend with daily BMP    Parkinson's Dz  -Continue home benztropine    Mood Disorder  -Continue home risperidone    Dyslipidemia  -Home statin    PUD  -PPI                FEN/GI -  NS @ 100 ml/hr  Activity - As tolerated  DVT prophylaxis - Heparin  GI prophylaxis -  NI  Disposition - Home    CODE STATUS:  Full code       Signed By: Cruzito Bardales MD     July 1, 2021

## 2021-07-01 NOTE — PROGRESS NOTES
Problem: Self Care Deficits Care Plan (Adult)  Goal: *Acute Goals and Plan of Care (Insert Text)  Description:   FUNCTIONAL STATUS PRIOR TO ADMISSION:Patient is a poor historian at this time. However, per chart, patient was independent with ADLs at baseline with h/o PD and a mood disorder. HOME SUPPORT: The patient lived alone with a son who lives nearby to provide assistance. Occupational Therapy Goals  Initiated 7/1/2021  1. Patient will perform upper body dressing with independence within 7 day(s). 2.  Patient will perform lower body dressing with independence within 7 day(s). 3.  Patient will perform bathing with independence within 7 day(s). 4.  Patient will perform toilet transfers with independence within 7 day(s). 5.  Patient will perform all aspects of toileting with independence within 7 day(s). 6.  Patient will participate in upper extremity therapeutic exercise/activities with supervision/set-up for 5 minutes within 7 day(s). 7.  Patient will utilize energy conservation techniques during functional activities with verbal cues within 5 day(s). Outcome: Progressing Towards Goal   OCCUPATIONAL THERAPY EVALUATION  Patient: Marcelle Shane (1 y.o. female)  Date: 7/1/2021  Primary [facility]: [facility] [A41.9]        Precautions: Fall, Bed Alarm    ASSESSMENT  Based on the objective data described below, the patient presents with generalized weakness, decreased endurance, slightly impaired standing balance, confusion (A&Ox2), agitation, distractibility, impulsivity, poor safety awareness, and decreased insight into deficits s/p admission for severe sepsis. Noted patient admitted to the ED with confusion and poor PO intake (son reported increase in bizarre behavior/confusion prior to admission). Patient largely SBA for ADL tasks this evaluation however with lack of insight into IV in arm and other lines attached and needing close guard for safety.  Recommend home with HHOT and close supervision for safety when medically ready for discharge. If patient's cognition does not clear, she will likely need SNF for safety. Current Level of Function Impacting Discharge (ADLs/self-care): SBA    Functional Outcome Measure: The patient scored 60/100 on the Barthel Index outcome measure which is indicative of 40% ADL impairment. Other factors to consider for discharge: safety     Patient will benefit from skilled therapy intervention to address the above noted impairments. PLAN :  Recommendations and Planned Interventions: self care training, functional mobility training, therapeutic exercise, balance training, therapeutic activities, endurance activities, patient education, home safety training, and family training/education    Frequency/Duration: Patient will be followed by occupational therapy 5 times a week to address goals. Recommendation for discharge: (in order for the patient to meet his/her long term goals)  Occupational therapy at least 2 days/week in the home AND ensure assist and/or supervision for safety with ADL and IADLs at all times vs SNF pending cognitive status    This discharge recommendation:  Has not yet been discussed the attending provider and/or case management    IF patient discharges home will need the following DME: TBD       SUBJECTIVE:   Patient stated You do NOT need to be in here you are just saying you do (patient becoming agitated seated on commode with OT managing IV pole/line and cracking door open to observe patient for safety; explained this was necessary at this time for fall prevention).     OBJECTIVE DATA SUMMARY:   HISTORY:   Past Medical History:   Diagnosis Date    Cancer ([facility] 2010, 2015    BREAST right X2    Diabetes ([facility]     TYPE II    Headache     AWOKE WITH HEADACHE TODAY; HAS HEADACHE ALMOST EVERY DAY, SHE STATES; RELIEVED BY TYLENOL.     Hypertension     Parkinsonism ([facility]     Psychiatric disorder     TAKES RISPERIDONE, PT STATES, FOR \"MOOD\"    PUD (peptic ulcer disease)     peptic ulcer; no bleeding    Shortness of breath      Past Surgical History:   Procedure Laterality Date    HX BREAST LUMPECTOMY Left 2010, 2016     BREAST    HX BREAST RECONSTRUCTION Left 6/8/2016    REMOVE TISSUE EXPANDER,PLACEMENT OF IMPLANT LEFT BREAST,REVISE LEFT RECONSTRUCTED BREAST,LIPOSUCTION OF LEFT BREAST, RIGHT MASTOPEXY FOR SYMMETRY performed by Nini Sales MD at [place] [address]    HX BREAST RECONSTRUCTION Left 10/5/2016    [address]PLE [address] WITH LEFT GROIN SKIN GRAFT, EXCISION TISSUE LEFT BREAST  performed by Nini Sales MD at [place] [address]    HX HYSTERECTOMY      TOTAL    HX OTHER SURGICAL      SEBACEOUS CYST REMOVAL UPPER BACK       Expanded or extensive additional review of patient history:     Home Situation  Home Environment: Private residence  # Steps to Enter:  (patient does not know)  One/Two Story Residence: Two story  # of Interior Steps:  (12?)  Living Alone: Yes  Support Systems: Child(riuz) (son lives nearby)  Current DME Used/Available at Home: Grab bars, Walker (questionable)  Tub or Shower Type: Shower    Hand dominance: Right    EXAMINATION OF PERFORMANCE DEFICITS:  Cognitive/Behavioral Status:  Neurologic State: Alert  Orientation Level: Oriented to person;Oriented to place; Disoriented to situation;Disoriented to time  Cognition: Decreased attention/concentration; Impulsive;Poor safety awareness; Impaired decision making  Perception: Appears intact  Perseveration: No perseveration noted  Safety/Judgement: Decreased insight into deficits; Decreased awareness of need for safety;Decreased awareness of need for assistance    Skin: exposed areas grossly intact; IV in RUE    Edema: none noted       Vision/Perceptual:                                Corrective Lenses: Reading glasses    Range of Motion:  BUE  AROM: Generally decreased, functional                         Strength:  BUE  Strength: Generally decreased, functional                Coordination:     Fine Motor Skills-Upper: Left Intact; Right Intact    Gross Motor Skills-Upper: Left Intact; Right Intact    Tone & Sensation:  BUE intact     Sensation: Impaired (at baseline in feet)                      Balance:  Sitting: Intact; Without support  Standing: Impaired; Without support  Standing - Static: Good  Standing - Dynamic : Fair    Functional Mobility and Transfers for ADLs:  Bed Mobility:  Supine to Sit: Stand-by assistance; Additional time;Bed Modified  Sit to Supine: Stand-by assistance    Transfers:  Sit to Stand: Stand-by assistance  Stand to Sit: Stand-by assistance  Toilet Transfer : Stand-by assistance    ADL Assessment:  Feeding: Setup;Supervision (infer 2* cognition)    Oral Facial Hygiene/Grooming: Stand-by assistance    Bathing: Stand-by assistance (infer 2* balance)    Upper Body Dressing: Setup;Supervision (infer 2* BUE ROM)    Lower Body Dressing: Stand-by assistance    Toileting: Stand by assistance                ADL Intervention and task modifications:       Grooming  Position Performed: Standing  Washing Hands: Stand-by assistance                   Lower Body Dressing Assistance  Socks: Stand-by assistance    Toileting  Toileting Assistance: Stand-by assistance  Bladder Hygiene: Stand-by assistance  Bowel Hygiene: Stand-by assistance    Cognitive Retraining  Safety/Judgement: Decreased insight into deficits; Decreased awareness of need for safety;Decreased awareness of need for assistance    Functional Measure:  Barthel Index:    Bathin  Bladder: 10  Bowels: 10  Groomin  Dressing: 10  Feeding: 10  Mobility: 0  Stairs: 0  Toilet Use: 5  Transfer (Bed to Chair and Back): 10  Total: 60/100        The Barthel ADL Index: Guidelines  1. The index should be used as a record of what a patient does, not as a record of what a patient could do.   2. The main aim is to establish degree of independence from any help, physical or verbal, however minor and for whatever reason. 3. The need for supervision renders the patient not independent. 4. A patient's performance should be established using the best available evidence. Asking the patient, friends/relatives and nurses are the usual sources, but direct observation and common sense are also important. However direct testing is not needed. 5. Usually the patient's performance over the preceding 24-48 hours is important, but occasionally longer periods will be relevant. 6. Middle categories imply that the patient supplies over 50 per cent of the effort. 7. Use of aids to be independent is allowed. Nabila Evans., Barthel, D.W. ([id number]). Functional evaluation: the Barthel Index. [address] [facility] (14)2. Sathya Rowley charlette MIRELLA Coe, Ladi Phillips., Samaria Ross., Rebecca, [facility] (1999). Measuring the change indisability after inpatient rehabilitation; comparison of the responsiveness of the Barthel Index and Functional Wright Measure. Journal of Neurology, Neurosurgery, and Psychiatry, 66(4), 021-004. Nery Rao, NTASIA.A, TREVA Gallardo, & Talha Lay MGiuseppeA. (2004.) Assessment of post-stroke quality of life in cost-effectiveness studies: The usefulness of the Barthel Index and the EuroQoL-5D. Quality of Life Research, 15, 902-56        Occupational Therapy Evaluation Charge Determination   History Examination Decision-Making   LOW Complexity : Brief history review  MEDIUM Complexity : 3-5 performance deficits relating to physical, cognitive , or psychosocial skils that result in activity limitations and / or participation restrictions MEDIUM Complexity : Patient may present with comorbidities that affect occupational performnce.  Miniml to moderate modification of tasks or assistance (eg, physical or verbal ) with assesment(s) is necessary to enable patient to complete evaluation       Based on the above components, the patient evaluation is determined to be of the following complexity level: LOW   Pain Rating:  No complaints    Activity Tolerance:   Fair, requires rest breaks, and BP dropped after functional mobility to bathroom    After treatment patient left in no apparent distress:    Supine in bed, Call bell within reach, Bed / chair alarm activated, and Side rails x 3    COMMUNICATION/EDUCATION:   The patients plan of care was discussed with: Physical therapist and Registered nurse. Home safety education was provided and the patient/caregiver indicated understanding., Patient/family have participated as able in goal setting and plan of care. , and Patient/family agree to work toward stated goals and plan of care. This patients plan of care is appropriate for delegation to [facility].     Thank you for this referral.  Clarice Owusu  Time Calculation: 33 mins

## 2021-07-01 NOTE — PROGRESS NOTES
FREDY:  RUR: 15%    Disposition:  Home  With home health vs SNF. Chart reviewed. Patient admitted here on 6/30/21 from home with disorientation (since Monday). Per the son, patient was restless and confused. Per the son, patient is alert and oriented x 4 at baseline. The patient has a past medical hx of Breast Ca, DM, HTN, Parkinson's Disease, Mood Disorder and PUD. CM noted PT and OT assessments with recommendations for home health vs SNF and home health vs Rehab vs none. CM made attempt to visit patient for assessment. Margoth Myles was asleep and asked that CM return at a later time.     Eva Olmos, [address], [address]

## 2021-07-01 NOTE — PROGRESS NOTES
[facility] Adult  Hospitalist Group                                                                                          Hospitalist Progress Note  Tonja Bey MD  Answering service: [phone] from in house phone        Date of Service:  2021  NAME:  Christy Glover  :  1955  MRN:  [id number]      Admission Summary:     Christy Glover is a 74 y.o. female with hx of NIDDM II, Dyslipidemia, Parkinson's, Mood disorder, PUD who presented to ed for confusion and poor po intake. Patient is seen and examined at bedside. She is alert and oriented to person and place but still mildly confused and provides limited history. States she has had persistent nausea over the last few days as well as lower abdominal pain. Per chart review, son reported that patient over the last 3 days has become increasingly confused. The patient denies any fever, chills, chest pain, cough, congestion, recent illness, palpitations, or dysuria. Interval history / Subjective:     Sleepy wakeful but lethargic, not answering questions or follows command.       Assessment & Plan:     Severe sepsis secondary to acute cystitis POA  -continue on iv rocephin, IVF  -fever 101.8 on , tachycardic, tachypenic, RR 33, leukocytosis, AMANDEEP, lactic acid 2.4 now improving to 1.6  -follow up urine and blood cx    T2DM  -check A1c  -add lantus 15 units, sliding scale and monitor finger stick glucose   -home glimepiride on hold    AMANDEEP  -continue on normal saline   -Creatinine improving  -avoid nephrotoxin   -monitor renal function     Anemia likely due to chronic disease   -stable, Hgb 11.1  -monitor H/H    Hypokalemia  -replace with kcl and repeat k level in am    Hx of HTN  -BP normal, on verapamil on hold, monitor BP  -creatinine improving, Cr 2.56 from 2.81  -avoid nephrotoxin   -monitor renal function     Mood disorder   -stable, continue on cogentin, risperidone    Dyslipidemia   -stable, continue lipitor    Hx of PUD  -add protonix    Acute metabolic encephalopathy  -improving    Hx of Parkinson's disease   -not on medication        Code status: Full Code  DVT prophylaxis: heparin    Care Plan discussed with: Patient/Family, Nurse and   Anticipated Disposition: Home w/Family  Anticipated Discharge: Greater than 48 hours     Hospital Problems  Date Reviewed: 10/5/2016        Codes Class Noted POA    Sepsis ([address] ICD-10-CM: A41.9  ICD-9-CM: 038.9, 995.91  6/30/2021 Unknown                 Vital Signs:    Last 24hrs VS reviewed since prior progress note. Most recent are:  Visit Vitals  /80   Pulse 93   Temp 97.5 °F (36.4 °C)   Resp 21   Wt 98 kg (216 lb 0.8 oz)   SpO2 98%   BMI 38.27 kg/m²       No intake or output data in the 24 hours ending 07/01/21 0804     Physical Examination:     I had a face to face encounter with this patient and independently examined them on 7/1/2021 as outlined below:          Constitutional:  No acute distress, cooperative, pleasant    ENT:  Oral mucosa moist, oropharynx benign. Resp:  CTA bilaterally. No wheezing/rhonchi/rales. No accessory muscle use   CV:  Regular rhythm, normal rate, no murmurs, gallops, rubs    GI:  Soft, non distended, non tender.  normoactive bowel sounds, no hepatosplenomegaly     Musculoskeletal:  No edema,     Neurologic:  Lethargic, doesn't follow command, moves extremities spontaneously             Data Review:    Review and/or order of clinical lab test  Review and/or order of tests in the radiology section of CPT  Review and/or order of tests in the medicine section of CPT      Labs:     Recent Labs     06/30/21  1858   WBC 25.4*   HGB 11.1*   HCT 34.0*        Recent Labs     07/01/21  0518 06/30/21  1858    138   K 3.4* 3.6    98   CO2 29 24   BUN 43* 45*   CREA 2.56* 2.81*   * 238*   CA 9.9 10.1   MG  --  2.1     Recent Labs     07/01/21  0518 06/30/21  1858   ALT 20 20    86   TBILI 0.5 0.7   TP 7.2 7.9   ALB 2.4* 3.0*   GLOB 4.8* 4.9*   LPSE  --  55*     Recent Labs     06/30/21  1858   INR 1.0   PTP 10.3      No results for input(s): FE, TIBC, PSAT, FERR in the last 72 hours. No results found for: FOL, RBCF   No results for input(s): PH, PCO2, PO2 in the last 72 hours.   Recent Labs     06/30/21 1858   *   TROIQ <0.05     No results found for: CHOL, CHOLX, CHLST, CHOLV, HDL, HDLP, LDL, LDLC, DLDLP, TGLX, TRIGL, TRIGP, CHHD, CHHDX  Lab Results   Component Value Date/Time    Glucose (POC) 248 (H) 06/30/2021 06:55 PM    Glucose (POC) 185 (H) 10/05/2016 10:54 AM    Glucose (POC) 163 (H) 10/05/2016 07:21 AM    Glucose (POC) 143 (H) 06/08/2016 03:41 PM    Glucose (POC) 155 (H) 06/08/2016 10:03 AM     Lab Results   Component Value Date/Time    Color YELLOW/STRAW 06/30/2021 06:58 PM    Appearance CLOUDY (A) 06/30/2021 06:58 PM    Specific gravity 1.025 06/30/2021 06:58 PM    pH (UA) 5.5 06/30/2021 06:58 PM    Protein 100 (A) 06/30/2021 06:58 PM    Glucose Negative 06/30/2021 06:58 PM    Ketone TRACE (A) 06/30/2021 06:58 PM    Urobilinogen 1.0 06/30/2021 06:58 PM    Nitrites Negative 06/30/2021 06:58 PM    Leukocyte Esterase MODERATE (A) 06/30/2021 06:58 PM    Epithelial cells FEW 06/30/2021 06:58 PM    Bacteria 3+ (A) 06/30/2021 06:58 PM    WBC  06/30/2021 06:58 PM    RBC 0-5 06/30/2021 06:58 PM         Medications Reviewed:     Current Facility-Administered Medications   Medication Dose Route Frequency    benztropine (COGENTIN) tablet 0.5 mg  0.5 mg Oral DAILY    atorvastatin (LIPITOR) tablet 10 mg  10 mg Oral QHS    risperiDONE (RisperDAL) tablet 3 mg  3 mg Oral BID    sodium chloride (NS) flush 5-40 mL  5-40 mL IntraVENous Q8H    sodium chloride (NS) flush 5-40 mL  5-40 mL IntraVENous PRN    acetaminophen (TYLENOL) tablet 650 mg  650 mg Oral Q6H PRN    Or    acetaminophen (TYLENOL) suppository 650 mg  650 mg Rectal Q6H PRN    polyethylene glycol (MIRALAX) packet 17 g  17 g Oral DAILY PRN  ondansetron (ZOFRAN ODT) tablet 4 mg  4 mg Oral Q8H PRN    Or    ondansetron (ZOFRAN) injection 4 mg  4 mg IntraVENous Q6H PRN    0.9% sodium chloride infusion  150 mL/hr IntraVENous CONTINUOUS    heparin (porcine) injection 5,000 Units  5,000 Units SubCUTAneous Q8H    cefTRIAXone (ROCEPHIN) 1 g in 0.9% sodium chloride (MBP/ADV) 50 mL MBP  1 g IntraVENous Q24H    potassium chloride SR (KLOR-CON 10) tablet 40 mEq  40 mEq Oral NOW     ______________________________________________________________________  EXPECTED LENGTH OF STAY: - - -  ACTUAL LENGTH OF STAY:          1                 Jan Quan MD

## 2021-07-02 LAB
ALBUMIN SERPL-MCNC: 2.2 G/DL (ref 3.5–5)
ALBUMIN/GLOB SERPL: 0.5 {RATIO} (ref 1.1–2.2)
ALP SERPL-CCNC: 130 U/L (ref 45–117)
ALT SERPL-CCNC: 51 U/L (ref 12–78)
ANION GAP SERPL CALC-SCNC: 8 MMOL/L (ref 5–15)
AST SERPL-CCNC: 88 U/L (ref 15–37)
BILIRUB SERPL-MCNC: 0.7 MG/DL (ref 0.2–1)
BUN SERPL-MCNC: 41 MG/DL (ref 6–20)
BUN/CREAT SERPL: 22 (ref 12–20)
CALCIUM SERPL-MCNC: 9.5 MG/DL (ref 8.5–10.1)
CHLORIDE SERPL-SCNC: 106 MMOL/L (ref 97–108)
CO2 SERPL-SCNC: 22 MMOL/L (ref 21–32)
CREAT SERPL-MCNC: 1.86 MG/DL (ref 0.55–1.02)
ERYTHROCYTE [DISTWIDTH] IN BLOOD BY AUTOMATED COUNT: 14.6 % (ref 11.5–14.5)
GLOBULIN SER CALC-MCNC: 4.6 G/DL (ref 2–4)
GLUCOSE BLD STRIP.AUTO-MCNC: 106 MG/DL (ref 65–117)
GLUCOSE BLD STRIP.AUTO-MCNC: 109 MG/DL (ref 65–117)
GLUCOSE BLD STRIP.AUTO-MCNC: 133 MG/DL (ref 65–117)
GLUCOSE BLD STRIP.AUTO-MCNC: 134 MG/DL (ref 65–117)
GLUCOSE SERPL-MCNC: 115 MG/DL (ref 65–100)
HCT VFR BLD AUTO: 29.3 % (ref 35–47)
HGB BLD-MCNC: 9.7 G/DL (ref 11.5–16)
MCH RBC QN AUTO: 27.2 PG (ref 26–34)
MCHC RBC AUTO-ENTMCNC: 33.1 G/DL (ref 30–36.5)
MCV RBC AUTO: 82.3 FL (ref 80–99)
NRBC # BLD: 0 K/UL (ref 0–0.01)
NRBC BLD-RTO: 0 PER 100 WBC
PLATELET # BLD AUTO: 233 K/UL (ref 150–400)
PMV BLD AUTO: 10.2 FL (ref 8.9–12.9)
POTASSIUM SERPL-SCNC: 3.3 MMOL/L (ref 3.5–5.1)
PROT SERPL-MCNC: 6.8 G/DL (ref 6.4–8.2)
RBC # BLD AUTO: 3.56 M/UL (ref 3.8–5.2)
SERVICE CMNT-IMP: ABNORMAL
SERVICE CMNT-IMP: ABNORMAL
SERVICE CMNT-IMP: NORMAL
SERVICE CMNT-IMP: NORMAL
SODIUM SERPL-SCNC: 136 MMOL/L (ref 136–145)
WBC # BLD AUTO: 19.2 K/UL (ref 3.6–11)

## 2021-07-02 PROCEDURE — 85027 COMPLETE CBC AUTOMATED: CPT

## 2021-07-02 PROCEDURE — 74011000258 HC RX REV CODE- 258: Performed by: HOSPITALIST

## 2021-07-02 PROCEDURE — 36415 COLL VENOUS BLD VENIPUNCTURE: CPT

## 2021-07-02 PROCEDURE — 74011250637 HC RX REV CODE- 250/637: Performed by: HOSPITALIST

## 2021-07-02 PROCEDURE — 74011250636 HC RX REV CODE- 250/636: Performed by: STUDENT IN AN ORGANIZED HEALTH CARE EDUCATION/TRAINING PROGRAM

## 2021-07-02 PROCEDURE — 74011250636 HC RX REV CODE- 250/636: Performed by: HOSPITALIST

## 2021-07-02 PROCEDURE — 74011636637 HC RX REV CODE- 636/637: Performed by: HOSPITALIST

## 2021-07-02 PROCEDURE — 65660000001 HC RM ICU INTERMED STEPDOWN

## 2021-07-02 PROCEDURE — 80053 COMPREHEN METABOLIC PANEL: CPT

## 2021-07-02 PROCEDURE — 87040 BLOOD CULTURE FOR BACTERIA: CPT

## 2021-07-02 PROCEDURE — 74011250637 HC RX REV CODE- 250/637: Performed by: STUDENT IN AN ORGANIZED HEALTH CARE EDUCATION/TRAINING PROGRAM

## 2021-07-02 PROCEDURE — 82962 GLUCOSE BLOOD TEST: CPT

## 2021-07-02 RX ORDER — PANTOPRAZOLE SODIUM 40 MG/1
40 TABLET, DELAYED RELEASE ORAL
Status: DISCONTINUED | OUTPATIENT
Start: 2021-07-03 | End: 2021-07-13 | Stop reason: HOSPADM

## 2021-07-02 RX ORDER — VERAPAMIL HYDROCHLORIDE 240 MG/1
240 TABLET, FILM COATED, EXTENDED RELEASE ORAL
Status: DISCONTINUED | OUTPATIENT
Start: 2021-07-02 | End: 2021-07-13 | Stop reason: HOSPADM

## 2021-07-02 RX ORDER — BENZTROPINE MESYLATE 1 MG/1
0.5 TABLET ORAL 2 TIMES DAILY
Status: DISCONTINUED | OUTPATIENT
Start: 2021-07-02 | End: 2021-07-13 | Stop reason: HOSPADM

## 2021-07-02 RX ORDER — POTASSIUM CHLORIDE 7.45 MG/ML
10 INJECTION INTRAVENOUS
Status: COMPLETED | OUTPATIENT
Start: 2021-07-02 | End: 2021-07-02

## 2021-07-02 RX ORDER — RISPERIDONE 1 MG/1
3 TABLET, FILM COATED ORAL DAILY
Status: DISCONTINUED | OUTPATIENT
Start: 2021-07-03 | End: 2021-07-13 | Stop reason: HOSPADM

## 2021-07-02 RX ADMIN — Medication 10 ML: at 06:00

## 2021-07-02 RX ADMIN — ACETAMINOPHEN 650 MG: 325 TABLET ORAL at 21:48

## 2021-07-02 RX ADMIN — CEFTRIAXONE SODIUM 2 G: 2 INJECTION, POWDER, FOR SOLUTION INTRAMUSCULAR; INTRAVENOUS at 21:42

## 2021-07-02 RX ADMIN — INSULIN GLARGINE 15 UNITS: 100 INJECTION, SOLUTION SUBCUTANEOUS at 22:23

## 2021-07-02 RX ADMIN — HEPARIN SODIUM 5000 UNITS: 5000 INJECTION INTRAVENOUS; SUBCUTANEOUS at 04:08

## 2021-07-02 RX ADMIN — SODIUM CHLORIDE 150 ML/HR: 9 INJECTION, SOLUTION INTRAVENOUS at 04:11

## 2021-07-02 RX ADMIN — Medication 1 CAPSULE: at 09:27

## 2021-07-02 RX ADMIN — SODIUM CHLORIDE 150 ML/HR: 9 INJECTION, SOLUTION INTRAVENOUS at 18:07

## 2021-07-02 RX ADMIN — Medication 10 ML: at 21:42

## 2021-07-02 RX ADMIN — BENZTROPINE MESYLATE 0.5 MG: 1 TABLET ORAL at 18:15

## 2021-07-02 RX ADMIN — HEPARIN SODIUM 5000 UNITS: 5000 INJECTION INTRAVENOUS; SUBCUTANEOUS at 10:32

## 2021-07-02 RX ADMIN — POTASSIUM CHLORIDE 10 MEQ: 7.46 INJECTION, SOLUTION INTRAVENOUS at 10:31

## 2021-07-02 RX ADMIN — SODIUM CHLORIDE 150 ML/HR: 9 INJECTION, SOLUTION INTRAVENOUS at 10:33

## 2021-07-02 RX ADMIN — BENZTROPINE MESYLATE 0.5 MG: 1 TABLET ORAL at 09:27

## 2021-07-02 RX ADMIN — POTASSIUM CHLORIDE 10 MEQ: 7.46 INJECTION, SOLUTION INTRAVENOUS at 08:16

## 2021-07-02 RX ADMIN — HEPARIN SODIUM 5000 UNITS: 5000 INJECTION INTRAVENOUS; SUBCUTANEOUS at 18:15

## 2021-07-02 RX ADMIN — RISPERIDONE 3 MG: 1 TABLET ORAL at 09:27

## 2021-07-02 RX ADMIN — ACETAMINOPHEN 650 MG: 325 TABLET ORAL at 04:19

## 2021-07-02 RX ADMIN — ATORVASTATIN CALCIUM 10 MG: 10 TABLET, FILM COATED ORAL at 21:42

## 2021-07-02 RX ADMIN — VERAPAMIL HYDROCHLORIDE 240 MG: 120 TABLET, FILM COATED, EXTENDED RELEASE ORAL at 15:52

## 2021-07-02 NOTE — PROGRESS NOTES
Bedside shift change report given to Vani Read RN (oncoming nurse) by Arlene Sutton RN (offgoing nurse). Report included the following information SBAR, Kardex, ED Summary, Intake/Output, MAR and Cardiac Rhythm NSR. Hourly rounding done, pt in NSR, on room air, O2 saturation greater than 93%, ambulated to bathroom, had small bowel movement, reported headache, given tylenol, call bell within reach, use explained to pt     Problem: Falls - Risk of  Goal: *Absence of Falls  Description: Document [place] Fall Risk and appropriate interventions in the flowsheet. Outcome: Progressing Towards Goal  Note: Fall Risk Interventions:  Mobility Interventions: Communicate number of staff needed for ambulation/transfer, Patient to call before getting OOB    Mentation Interventions: Adequate sleep, hydration, pain control    Medication Interventions: Patient to call before getting OOB, Evaluate medications/consider consulting pharmacy    Elimination Interventions: Patient to call for help with toileting needs              Problem: Diabetes Self-Management  Goal: *Disease process and treatment process  Description: Define diabetes and identify own type of diabetes; list 3 options for treating diabetes. Outcome: Progressing Towards Goal  Goal: *Using medications safely  Description: State effect of diabetes medications on diabetes; name diabetes medication taking, action and side effects. Outcome: Progressing Towards Goal  Goal: *Prevention, detection, treatment of acute complications  Description: List symptoms of hyper- and hypoglycemia; describe how to treat low blood sugar and actions for lowering  high blood glucose level. Outcome: Progressing Towards Goal     Problem: Pressure Injury - Risk of  Goal: *Prevention of pressure injury  Description: Document Josué Scale and appropriate interventions in the flowsheet.   Outcome: Progressing Towards Goal  Note: Pressure Injury Interventions:  Sensory Interventions: Assess changes in LOC    Moisture Interventions: Absorbent underpads    Activity Interventions: Pressure redistribution bed/mattress(bed type)    Mobility Interventions: Pressure redistribution bed/mattress (bed type)    Nutrition Interventions: Document food/fluid/supplement intake

## 2021-07-02 NOTE — PROGRESS NOTES
Admission Medication Reconciliation:    Information obtained from:  Medication listed brought in by family member  RxQuery data available¹:  NO    Comments/Recommendations: Updated PTA meds/reviewed patient's allergies. 1)  Information obtained from medication list brought in by family member; did not interview patient or family member so unable to confirm with complete accuracy. 2)  Medication changes (since last review): Adjusted  - benztropine to 0.5 mg BID  -glimepiride to 2 mg BID  -risperdal to 3 mg daily    Removed  - promethazine PRN  -percocet PRN         ¹RxQuery pharmacy benefit data reflects medications filled and processed through the patient's insurance, however   this data does NOT capture whether the medication was picked up or is currently being taken by the patient. Allergies:  Patient has no known allergies. Significant PMH/Disease States:   Past Medical History:   Diagnosis Date    Cancer ([facility] 2010, 2015    BREAST right X2    Diabetes ([facility]     TYPE II    Headache     AWOKE WITH HEADACHE TODAY; HAS HEADACHE ALMOST EVERY DAY, SHE STATES; RELIEVED BY TYLENOL. Hypertension     Parkinsonism ([facility]     Psychiatric disorder     TAKES RISPERIDONE, PT STATES, FOR \"MOOD\"    PUD (peptic ulcer disease)     peptic ulcer; no bleeding    Shortness of breath      Chief Complaint for this Admission:    Chief Complaint   Patient presents with    Altered mental status     Prior to Admission Medications:   Prior to Admission Medications   Prescriptions Last Dose Informant Taking?   benztropine (COGENTIN) 0.5 mg tablet  Self Yes   Sig: Take 0.5 mg by mouth two (2) times a day. Indications: a type of movement disorder called parkinsonism   glimepiride (AMARYL) 2 mg tablet  Self Yes   Sig: Take 2 mg by mouth two (2) times a day. indapamide (LOZOL) 1.25 mg tablet  Self Yes   Sig: Take 1.25 mg by mouth daily. lovastatin (MEVACOR) 20 mg tablet  Self Yes   Sig: Take 20 mg by mouth nightly. omeprazole (PRILOSEC) 20 mg capsule  Self Yes   Sig: Take 20 mg by mouth daily. risperiDONE (RISPERDAL) 3 mg tablet  Self Yes   Sig: Take 3 mg by mouth daily. verapamil ER (CALAN-SR) 240 mg CR tablet  Self Yes   Sig: Take 240 mg by mouth daily. Facility-Administered Medications: None     Please contact the main inpatient pharmacy with any questions or concerns at [phone] and we will direct you to the clinical pharmacist covering this patient's care while in-house.    Jacky Snyder, PHARMD

## 2021-07-02 NOTE — PROGRESS NOTES
[address] [facility] Adult  Hospitalist Group                                                                                          Hospitalist Progress Note  Mary Ellen Alves MD  Answering service: [phone] from in house phone        Date of Service:  2021  NAME:  Pedro iNeves  :  1955  MRN:  [id number]      Admission Summary:     Pedro Nieves is a 74 y.o. female with hx of NIDDM II, Dyslipidemia, Parkinson's, Mood disorder, PUD who presented to ed for confusion and poor po intake. Patient is seen and examined at bedside. She is alert and oriented to person and place but still mildly confused and provides limited history. States she has had persistent nausea over the last few days as well as lower abdominal pain. Per chart review, son reported that patient over the last 3 days has become increasingly confused. The patient denies any fever, chills, chest pain, cough, congestion, recent illness, palpitations, or dysuria.     Interval history / Subjective:     She said she feels better today, no lower abdominal pain, nausea or vomiting     Assessment & Plan:     Severe sepsis secondary to acute cystitis POA  -continue on iv rocephin, IVF, tutu Q  - fever was 101.8 on , tachycardic, tachypenic, RR 33, leukocytosis, AMANDEEP, lactic acid 2.4 now improving to 1.6  -low grade fever, leukocytosis and tachycardia improving  -follow up urine cx    Bacteremia due to acute cystitis   -on iv ceftriaxone   -blood cx grow on  gram negative rods in all 4 bottles, identification pending  -repeated blood cx    T2DM  -A1c 6.8  -continue lantus 15 units, sliding scale and monitor finger stick glucose   -home glimepiride on hold    AMANDEEP  -continue on normal saline   -creatinine improving, Cr 1.86 from 2.81  -avoid nephrotoxin   -monitor renal function     Anemia likely due to chronic disease   -stable, Hgb 9.7  -monitor H/H    Hypokalemia  -replace with kcl and repeat k level in am    Hx of HTN  -BP not at goal, add home verapamil , monitor BP    Mood disorder   -stable, continue on cogentin and risperidone    Dyslipidemia   -stable, continue lipitor    Hx of PUD  -stable, continue protonix    Acute metabolic encephalopathy  -improving  -conscious and well oriented    Hx of Parkinson's disease   -not on medication  -PT/OT        Code status: Full Code  DVT prophylaxis: heparin    Care Plan discussed with: Patient/Family, Nurse and   Anticipated Disposition: Home w/Family  Anticipated Discharge: Greater than 48 hours     Hospital Problems  Date Reviewed: 10/5/2016        Codes Class Noted POA    Sepsis ([facility] [address] ICD-10-CM: A41.9  ICD-9-CM: 038.9, 995.91  6/30/2021 Unknown                 Vital Signs:    Last 24hrs VS reviewed since prior progress note. Most recent are:  Visit Vitals  BP (!) 142/95 (BP 1 Location: Right upper arm, BP Patient Position: At rest)   Pulse 89   Temp 98.1 °F (36.7 °C)   Resp 22   Wt 102.9 kg (226 lb 14.4 oz)   SpO2 94%   BMI 40.19 kg/m²       No intake or output data in the 24 hours ending 07/02/21 0930     Physical Examination:     I had a face to face encounter with this patient and independently examined them on 7/2/2021 as outlined below:          Constitutional:  No acute distress, cooperative, pleasant    ENT:  Oral mucosa moist, oropharynx benign. Resp:  CTA bilaterally. No wheezing/rhonchi/rales. No accessory muscle use   CV:  Regular rhythm, normal rate, no murmurs, gallops, rubs    GI:  Soft, non distended, non tender.  normoactive bowel sounds, no hepatosplenomegaly     Musculoskeletal:  No edema,     Neurologic:  Conscious and alert, well oriented, moves all extremities, CN II-XII grossly intact            Data Review:    Review and/or order of clinical lab test  Review and/or order of tests in the radiology section of CPT  Review and/or order of tests in the medicine section of CPT      Labs:     Recent Labs     07/02/21  0512 06/30/21  1858   WBC 19.2* 25.4*   HGB 9.7* 11.1*   HCT 29.3* 34.0*    250     Recent Labs     07/02/21 0512 07/01/21 0518 06/30/21 1858    137 138   K 3.3* 3.4* 3.6    102 98   CO2 22 29 24   BUN 41* 43* 45*   CREA 1.86* 2.56* 2.81*   * 216* 238*   CA 9.5 9.9 10.1   MG  --   --  2.1     Recent Labs     07/02/21 0512 07/01/21 0518 06/30/21 1858   ALT 51 20 20   * 106 86   TBILI 0.7 0.5 0.7   TP 6.8 7.2 7.9   ALB 2.2* 2.4* 3.0*   GLOB 4.6* 4.8* 4.9*   LPSE  --   --  55*     Recent Labs     06/30/21 1858   INR 1.0   PTP 10.3      No results for input(s): FE, TIBC, PSAT, FERR in the last 72 hours. No results found for: FOL, RBCF   No results for input(s): PH, PCO2, PO2 in the last 72 hours.   Recent Labs     06/30/21 1858   *   TROIQ <0.05     No results found for: CHOL, CHOLX, CHLST, CHOLV, HDL, HDLP, LDL, LDLC, DLDLP, TGLX, TRIGL, TRIGP, CHHD, CHHDX  Lab Results   Component Value Date/Time    Glucose (POC) 109 07/02/2021 08:26 AM    Glucose (POC) 159 (H) 07/01/2021 09:16 PM    Glucose (POC) 134 (H) 07/01/2021 04:58 PM    Glucose (POC) 248 (H) 06/30/2021 06:55 PM    Glucose (POC) 185 (H) 10/05/2016 10:54 AM     Lab Results   Component Value Date/Time    Color YELLOW/STRAW 06/30/2021 06:58 PM    Appearance CLOUDY (A) 06/30/2021 06:58 PM    Specific gravity 1.025 06/30/2021 06:58 PM    pH (UA) 5.5 06/30/2021 06:58 PM    Protein 100 (A) 06/30/2021 06:58 PM    Glucose Negative 06/30/2021 06:58 PM    Ketone TRACE (A) 06/30/2021 06:58 PM    Urobilinogen 1.0 06/30/2021 06:58 PM    Nitrites Negative 06/30/2021 06:58 PM    Leukocyte Esterase MODERATE (A) 06/30/2021 06:58 PM    Epithelial cells FEW 06/30/2021 06:58 PM    Bacteria 3+ (A) 06/30/2021 06:58 PM    WBC  06/30/2021 06:58 PM    RBC 0-5 06/30/2021 06:58 PM         Medications Reviewed:     Current Facility-Administered Medications   Medication Dose Route Frequency    potassium chloride 10 mEq in 100 ml IVPB  10 mEq IntraVENous Q1H    benztropine (COGENTIN) tablet 0.5 mg  0.5 mg Oral DAILY    atorvastatin (LIPITOR) tablet 10 mg  10 mg Oral QHS    risperiDONE (RisperDAL) tablet 3 mg  3 mg Oral BID    sodium chloride (NS) flush 5-40 mL  5-40 mL IntraVENous Q8H    sodium chloride (NS) flush 5-40 mL  5-40 mL IntraVENous PRN    acetaminophen (TYLENOL) tablet 650 mg  650 mg Oral Q6H PRN    Or    acetaminophen (TYLENOL) suppository 650 mg  650 mg Rectal Q6H PRN    polyethylene glycol (MIRALAX) packet 17 g  17 g Oral DAILY PRN    ondansetron (ZOFRAN ODT) tablet 4 mg  4 mg Oral Q8H PRN    Or    ondansetron (ZOFRAN) injection 4 mg  4 mg IntraVENous Q6H PRN    0.9% sodium chloride infusion  150 mL/hr IntraVENous CONTINUOUS    heparin (porcine) injection 5,000 Units  5,000 Units SubCUTAneous Q8H    insulin glargine (LANTUS) injection 15 Units  15 Units SubCUTAneous QHS    insulin lispro (HUMALOG) injection   SubCUTAneous AC&HS    glucose chewable tablet 16 g  4 Tablet Oral PRN    dextrose (D50W) injection syrg 12.5-25 g  12.5-25 g IntraVENous PRN    glucagon (GLUCAGEN) injection 1 mg  1 mg IntraMUSCular PRN    L.acidophilus-paracasei-S.thermophil-bifidobacter (RISAQUAD) 8 billion cell capsule  1 Capsule Oral DAILY    cefTRIAXone (ROCEPHIN) 2 g in 0.9% sodium chloride (MBP/ADV) 50 mL MBP  2 g IntraVENous Q24H     ______________________________________________________________________  EXPECTED LENGTH OF STAY: 4d 19h  ACTUAL LENGTH OF STAY:          2                 Elio Meigs, MD

## 2021-07-03 LAB
ALBUMIN SERPL-MCNC: 2 G/DL (ref 3.5–5)
ALBUMIN/GLOB SERPL: 0.5 {RATIO} (ref 1.1–2.2)
ALP SERPL-CCNC: 124 U/L (ref 45–117)
ALT SERPL-CCNC: 61 U/L (ref 12–78)
ANION GAP SERPL CALC-SCNC: 6 MMOL/L (ref 5–15)
AST SERPL-CCNC: 66 U/L (ref 15–37)
BACTERIA SPEC CULT: ABNORMAL
BACTERIA SPEC CULT: ABNORMAL
BILIRUB SERPL-MCNC: 0.3 MG/DL (ref 0.2–1)
BUN SERPL-MCNC: 33 MG/DL (ref 6–20)
BUN/CREAT SERPL: 20 (ref 12–20)
CALCIUM SERPL-MCNC: 9.4 MG/DL (ref 8.5–10.1)
CC UR VC: ABNORMAL
CHLORIDE SERPL-SCNC: 108 MMOL/L (ref 97–108)
CO2 SERPL-SCNC: 22 MMOL/L (ref 21–32)
CREAT SERPL-MCNC: 1.67 MG/DL (ref 0.55–1.02)
ERYTHROCYTE [DISTWIDTH] IN BLOOD BY AUTOMATED COUNT: 14.6 % (ref 11.5–14.5)
GLOBULIN SER CALC-MCNC: 4.4 G/DL (ref 2–4)
GLUCOSE BLD STRIP.AUTO-MCNC: 136 MG/DL (ref 65–117)
GLUCOSE BLD STRIP.AUTO-MCNC: 147 MG/DL (ref 65–117)
GLUCOSE BLD STRIP.AUTO-MCNC: 158 MG/DL (ref 65–117)
GLUCOSE BLD STRIP.AUTO-MCNC: 199 MG/DL (ref 65–117)
GLUCOSE SERPL-MCNC: 115 MG/DL (ref 65–100)
HCT VFR BLD AUTO: 28.7 % (ref 35–47)
HGB BLD-MCNC: 9.6 G/DL (ref 11.5–16)
MAGNESIUM SERPL-MCNC: 2.1 MG/DL (ref 1.6–2.4)
MCH RBC QN AUTO: 27.5 PG (ref 26–34)
MCHC RBC AUTO-ENTMCNC: 33.4 G/DL (ref 30–36.5)
MCV RBC AUTO: 82.2 FL (ref 80–99)
NRBC # BLD: 0 K/UL (ref 0–0.01)
NRBC BLD-RTO: 0 PER 100 WBC
PHOSPHATE SERPL-MCNC: 2.7 MG/DL (ref 2.6–4.7)
PLATELET # BLD AUTO: 257 K/UL (ref 150–400)
PMV BLD AUTO: 10.3 FL (ref 8.9–12.9)
POTASSIUM SERPL-SCNC: 3.5 MMOL/L (ref 3.5–5.1)
PROT SERPL-MCNC: 6.4 G/DL (ref 6.4–8.2)
RBC # BLD AUTO: 3.49 M/UL (ref 3.8–5.2)
SERVICE CMNT-IMP: ABNORMAL
SODIUM SERPL-SCNC: 136 MMOL/L (ref 136–145)
WBC # BLD AUTO: 16 K/UL (ref 3.6–11)

## 2021-07-03 PROCEDURE — 36415 COLL VENOUS BLD VENIPUNCTURE: CPT

## 2021-07-03 PROCEDURE — 82962 GLUCOSE BLOOD TEST: CPT

## 2021-07-03 PROCEDURE — 83735 ASSAY OF MAGNESIUM: CPT

## 2021-07-03 PROCEDURE — 74011250637 HC RX REV CODE- 250/637: Performed by: HOSPITALIST

## 2021-07-03 PROCEDURE — 85027 COMPLETE CBC AUTOMATED: CPT

## 2021-07-03 PROCEDURE — 74011250636 HC RX REV CODE- 250/636: Performed by: STUDENT IN AN ORGANIZED HEALTH CARE EDUCATION/TRAINING PROGRAM

## 2021-07-03 PROCEDURE — 74011636637 HC RX REV CODE- 636/637: Performed by: HOSPITALIST

## 2021-07-03 PROCEDURE — 74011250636 HC RX REV CODE- 250/636: Performed by: HOSPITALIST

## 2021-07-03 PROCEDURE — 65270000029 HC RM PRIVATE

## 2021-07-03 PROCEDURE — 74011250637 HC RX REV CODE- 250/637: Performed by: STUDENT IN AN ORGANIZED HEALTH CARE EDUCATION/TRAINING PROGRAM

## 2021-07-03 PROCEDURE — 74011000258 HC RX REV CODE- 258: Performed by: HOSPITALIST

## 2021-07-03 PROCEDURE — 80053 COMPREHEN METABOLIC PANEL: CPT

## 2021-07-03 PROCEDURE — 84100 ASSAY OF PHOSPHORUS: CPT

## 2021-07-03 RX ADMIN — CEFTRIAXONE SODIUM 2 G: 2 INJECTION, POWDER, FOR SOLUTION INTRAMUSCULAR; INTRAVENOUS at 20:53

## 2021-07-03 RX ADMIN — BENZTROPINE MESYLATE 0.5 MG: 1 TABLET ORAL at 18:21

## 2021-07-03 RX ADMIN — HEPARIN SODIUM 5000 UNITS: 5000 INJECTION INTRAVENOUS; SUBCUTANEOUS at 03:40

## 2021-07-03 RX ADMIN — ACETAMINOPHEN 650 MG: 325 TABLET ORAL at 18:25

## 2021-07-03 RX ADMIN — Medication 10 ML: at 13:34

## 2021-07-03 RX ADMIN — ACETAMINOPHEN 650 MG: 325 TABLET ORAL at 09:08

## 2021-07-03 RX ADMIN — RISPERIDONE 3 MG: 1 TABLET ORAL at 09:08

## 2021-07-03 RX ADMIN — SODIUM CHLORIDE 100 ML/HR: 9 INJECTION, SOLUTION INTRAVENOUS at 19:20

## 2021-07-03 RX ADMIN — INSULIN GLARGINE 15 UNITS: 100 INJECTION, SOLUTION SUBCUTANEOUS at 21:14

## 2021-07-03 RX ADMIN — VERAPAMIL HYDROCHLORIDE 240 MG: 120 TABLET, FILM COATED, EXTENDED RELEASE ORAL at 09:08

## 2021-07-03 RX ADMIN — HEPARIN SODIUM 5000 UNITS: 5000 INJECTION INTRAVENOUS; SUBCUTANEOUS at 12:19

## 2021-07-03 RX ADMIN — PANTOPRAZOLE SODIUM 40 MG: 40 TABLET, DELAYED RELEASE ORAL at 09:08

## 2021-07-03 RX ADMIN — INSULIN LISPRO 2 UNITS: 100 INJECTION, SOLUTION INTRAVENOUS; SUBCUTANEOUS at 13:16

## 2021-07-03 RX ADMIN — BENZTROPINE MESYLATE 0.5 MG: 1 TABLET ORAL at 09:08

## 2021-07-03 RX ADMIN — INSULIN LISPRO 2 UNITS: 100 INJECTION, SOLUTION INTRAVENOUS; SUBCUTANEOUS at 16:45

## 2021-07-03 RX ADMIN — Medication 10 ML: at 19:21

## 2021-07-03 RX ADMIN — Medication 1 CAPSULE: at 09:08

## 2021-07-03 RX ADMIN — HEPARIN SODIUM 5000 UNITS: 5000 INJECTION INTRAVENOUS; SUBCUTANEOUS at 18:21

## 2021-07-03 RX ADMIN — Medication 10 ML: at 07:00

## 2021-07-03 NOTE — PROGRESS NOTES
Problem: Falls - Risk of  Goal: *Absence of Falls  Description: Document [place]andy Fall Risk and appropriate interventions in the flowsheet. Outcome: Progressing Towards Goal  Note: Fall Risk Interventions:  Mobility Interventions: Communicate number of staff needed for ambulation/transfer    Mentation Interventions: Adequate sleep, hydration, pain control    Medication Interventions: Patient to call before getting OOB    Elimination Interventions: Call light in reach              Problem: Pressure Injury - Risk of  Goal: *Prevention of pressure injury  Description: Document Josué Scale and appropriate interventions in the flowsheet.   Outcome: Progressing Towards Goal  Note: Pressure Injury Interventions:  Sensory Interventions: Assess changes in LOC    Moisture Interventions: Absorbent underpads    Activity Interventions: Increase time out of bed    Mobility Interventions: Pressure redistribution bed/mattress (bed type)    Nutrition Interventions: Offer support with meals,snacks and hydration

## 2021-07-03 NOTE — PROGRESS NOTES
TRANSFER - IN REPORT:    Verbal report received from ANAI Powell(name) on Dee Marion  being received from [facility]) for routine progression of care      Report consisted of patients Situation, Background, Assessment and   Recommendations(SBAR). Information from the following report(s) SBAR, ED Summary, Intake/Output, MAR and Recent Results was reviewed with the receiving nurse. Opportunity for questions and clarification was provided. Assessment completed upon patients arrival to unit and care assumed.

## 2021-07-03 NOTE — PROGRESS NOTES
[address] [facility] Adult  Hospitalist Group                                                                                          Hospitalist Progress Note  Noah Jama MD  Answering service: [phone] from in house phone        Date of Service:  7/3/2021  NAME:  Dee Marion  :  1955  MRN:  [id number]      Admission Summary:     Dee Marion is a 74 y.o. female with hx of NIDDM II, Dyslipidemia, Parkinson's, Mood disorder, PUD who presented to ed for confusion and poor po intake. Patient is seen and examined at bedside. She is alert and oriented to person and place but still mildly confused and provides limited history. States she has had persistent nausea over the last few days as well as lower abdominal pain. Per chart review, son reported that patient over the last 3 days has become increasingly confused. The patient denies any fever, chills, chest pain, cough, congestion, recent illness, palpitations, or dysuria.     Interval history / Subjective:     She said she feels better,  no lower abdominal pain, nausea or vomiting     Assessment & Plan:     Severe sepsis secondary to acute cystitis POA  -continue on iv rocephin, IVF, tutu Q  -fever was 101.8 on , tachycardic, tachypenic, RR 33, leukocytosis, AMANDEEP, lactic acid 2.4 now improving to 1.6  -low grade fever, leukocytosis and tachycardia improving  -follow up urine cx    Bacteremia due to acute cystitis   -on iv ceftriaxone   -blood cx grow on  e coli, sensitive to ceftriaxone   -repeated blood cx    T2DM  -A1c 6.8  -continue lantus 15 units, sliding scale and monitor finger stick glucose   -home glimepiride on hold    AMANDEEP  -continue on normal saline   -creatinine improving, Cr 1.67 from 2.81  -avoid nephrotoxin   -monitor renal function     Anemia likely due to chronic disease   -stable, Hgb 9.7  -monitor H/H    Hypokalemia  -replaced and resolved    Hx of HTN  -BP fairly controlled, continue verapamil , monitor BP    Mood disorder   -stable, continue on cogentin and risperidone    Dyslipidemia   -stable, continue lipitor    Hx of PUD  -stable, continue protonix    Acute metabolic encephalopathy  -improved  -conscious and well oriented    Hx of Parkinson's disease   -not on medication  -PT/OT    Transfer to Medical floor     Code status: Full Code  DVT prophylaxis: heparin    Care Plan discussed with: Patient/Family, Nurse and   Anticipated Disposition: Home w/Family  Anticipated Discharge: Greater than 48 hours     Hospital Problems  Date Reviewed: 10/5/2016        Codes Class Noted POA    Sepsis ([facility] [address] ICD-10-CM: A41.9  ICD-9-CM: 038.9, 995.91  6/30/2021 Unknown                 Vital Signs:    Last 24hrs VS reviewed since prior progress note. Most recent are:  Visit Vitals  BP (!) 147/85   Pulse 92   Temp 98.4 °F (36.9 °C)   Resp 19   Wt 98.3 kg (216 lb 11.2 oz)   SpO2 95%   BMI 38.39 kg/m²       No intake or output data in the 24 hours ending 07/03/21 2773     Physical Examination:     I had a face to face encounter with this patient and independently examined them on 7/3/2021 as outlined below:          Constitutional:  No acute distress, cooperative, pleasant    ENT:  Oral mucosa moist, oropharynx benign. Resp:  CTA bilaterally. No wheezing/rhonchi/rales. No accessory muscle use   CV:  Regular rhythm, normal rate, no murmurs, gallops, rubs    GI:  Soft, non distended, non tender.  normoactive bowel sounds, no hepatosplenomegaly     Musculoskeletal:  No edema,     Neurologic:  Conscious and alert, well oriented, moves all extremities, CN II-XII grossly intact            Data Review:    Review and/or order of clinical lab test  Review and/or order of tests in the radiology section of CPT  Review and/or order of tests in the medicine section of CPT      Labs:     Recent Labs     07/03/21  0332 07/02/21  0512   WBC 16.0* 19.2*   HGB 9.6* 9.7*   HCT 28.7* 29.3*    233     Recent Labs 07/03/21 0332 07/02/21 0512 07/01/21 0518 06/30/21 1858 06/30/21 1858    136 137   < > 138   K 3.5 3.3* 3.4*   < > 3.6    106 102   < > 98   CO2 22 22 29   < > 24   BUN 33* 41* 43*   < > 45*   CREA 1.67* 1.86* 2.56*   < > 2.81*   * 115* 216*   < > 238*   CA 9.4 9.5 9.9   < > 10.1   MG 2.1  --   --   --  2.1   PHOS 2.7  --   --   --   --     < > = values in this interval not displayed. Recent Labs     07/03/21 0332 07/02/21 0512 07/01/21 0518 06/30/21 1858 06/30/21 1858   ALT 61 51 20   < > 20   * 130* 106   < > 86   TBILI 0.3 0.7 0.5   < > 0.7   TP 6.4 6.8 7.2   < > 7.9   ALB 2.0* 2.2* 2.4*   < > 3.0*   GLOB 4.4* 4.6* 4.8*   < > 4.9*   LPSE  --   --   --   --  55*    < > = values in this interval not displayed. Recent Labs     06/30/21 1858   INR 1.0   PTP 10.3      No results for input(s): FE, TIBC, PSAT, FERR in the last 72 hours. No results found for: FOL, RBCF   No results for input(s): PH, PCO2, PO2 in the last 72 hours.   Recent Labs     06/30/21 1858   *   TROIQ <0.05     No results found for: CHOL, CHOLX, CHLST, CHOLV, HDL, HDLP, LDL, LDLC, DLDLP, TGLX, TRIGL, TRIGP, CHHD, CHHDX  Lab Results   Component Value Date/Time    Glucose (POC) 134 (H) 07/02/2021 10:00 PM    Glucose (POC) 133 (H) 07/02/2021 05:30 PM    Glucose (POC) 106 07/02/2021 11:24 AM    Glucose (POC) 109 07/02/2021 08:26 AM    Glucose (POC) 159 (H) 07/01/2021 09:16 PM     Lab Results   Component Value Date/Time    Color YELLOW/STRAW 06/30/2021 06:58 PM    Appearance CLOUDY (A) 06/30/2021 06:58 PM    Specific gravity 1.025 06/30/2021 06:58 PM    pH (UA) 5.5 06/30/2021 06:58 PM    Protein 100 (A) 06/30/2021 06:58 PM    Glucose Negative 06/30/2021 06:58 PM    Ketone TRACE (A) 06/30/2021 06:58 PM    Urobilinogen 1.0 06/30/2021 06:58 PM    Nitrites Negative 06/30/2021 06:58 PM    Leukocyte Esterase MODERATE (A) 06/30/2021 06:58 PM    Epithelial cells FEW 06/30/2021 06:58 PM    Bacteria 3+ (A) 06/30/2021 06:58 PM    WBC  06/30/2021 06:58 PM    RBC 0-5 06/30/2021 06:58 PM         Medications Reviewed:     Current Facility-Administered Medications   Medication Dose Route Frequency    benztropine (COGENTIN) tablet 0.5 mg  0.5 mg Oral BID    risperiDONE (RisperDAL) tablet 3 mg  3 mg Oral DAILY    pantoprazole (PROTONIX) tablet 40 mg  40 mg Oral ACB    verapamil ER (CALAN-SR) tablet 240 mg  240 mg Oral DAILY WITH BREAKFAST    atorvastatin (LIPITOR) tablet 10 mg  10 mg Oral QHS    sodium chloride (NS) flush 5-40 mL  5-40 mL IntraVENous Q8H    sodium chloride (NS) flush 5-40 mL  5-40 mL IntraVENous PRN    acetaminophen (TYLENOL) tablet 650 mg  650 mg Oral Q6H PRN    Or    acetaminophen (TYLENOL) suppository 650 mg  650 mg Rectal Q6H PRN    polyethylene glycol (MIRALAX) packet 17 g  17 g Oral DAILY PRN    ondansetron (ZOFRAN ODT) tablet 4 mg  4 mg Oral Q8H PRN    Or    ondansetron (ZOFRAN) injection 4 mg  4 mg IntraVENous Q6H PRN    0.9% sodium chloride infusion  150 mL/hr IntraVENous CONTINUOUS    heparin (porcine) injection 5,000 Units  5,000 Units SubCUTAneous Q8H    insulin glargine (LANTUS) injection 15 Units  15 Units SubCUTAneous QHS    insulin lispro (HUMALOG) injection   SubCUTAneous AC&HS    glucose chewable tablet 16 g  4 Tablet Oral PRN    dextrose (D50W) injection syrg 12.5-25 g  12.5-25 g IntraVENous PRN    glucagon (GLUCAGEN) injection 1 mg  1 mg IntraMUSCular PRN    L.acidophilus-paracasei-S.thermophil-bifidobacter (RISAQUAD) 8 billion cell capsule  1 Capsule Oral DAILY    cefTRIAXone (ROCEPHIN) 2 g in 0.9% sodium chloride (MBP/ADV) 50 mL MBP  2 g IntraVENous Q24H     ______________________________________________________________________  EXPECTED LENGTH OF STAY: 4d 19h  ACTUAL LENGTH OF STAY:          3                 Evelyn Amos MD

## 2021-07-03 NOTE — PROGRESS NOTES
Problem: Falls - Risk of  Goal: *Absence of Falls  Description: Document Gregoria Locket Fall Risk and appropriate interventions in the flowsheet. Outcome: Progressing Towards Goal  Note: Fall Risk Interventions:  Mobility Interventions: Communicate number of staff needed for ambulation/transfer, Patient to call before getting OOB    Mentation Interventions: Adequate sleep, hydration, pain control    Medication Interventions: Teach patient to arise slowly, Patient to call before getting OOB    Elimination Interventions: Call light in reach, Toileting schedule/hourly rounds    Problem: Diabetes Self-Management  Goal: *Using medications safely  Description: State effect of diabetes medications on diabetes; name diabetes medication taking, action and side effects. Outcome: Progressing Towards Goal  Goal: *Monitoring blood glucose, interpreting and using results  Description: Identify recommended blood glucose targets  and personal targets. Outcome: Progressing Towards Goal     Problem: Pressure Injury - Risk of  Goal: *Prevention of pressure injury  Description: Document Josué Scale and appropriate interventions in the flowsheet.   Outcome: Progressing Towards Goal  Note: Pressure Injury Interventions:  Sensory Interventions: Assess changes in LOC    Moisture Interventions: Absorbent underpads    Activity Interventions: Increase time out of bed, Pressure redistribution bed/mattress(bed type)    Mobility Interventions: Pressure redistribution bed/mattress (bed type)    Nutrition Interventions: Document food/fluid/supplement intake  Problem: Breathing Pattern - Ineffective  Goal: *Absence of hypoxia  Outcome: Progressing Towards Goal

## 2021-07-04 LAB
ALBUMIN SERPL-MCNC: 2.1 G/DL (ref 3.5–5)
ALBUMIN/GLOB SERPL: 0.6 {RATIO} (ref 1.1–2.2)
ALP SERPL-CCNC: 130 U/L (ref 45–117)
ALT SERPL-CCNC: 59 U/L (ref 12–78)
ANION GAP SERPL CALC-SCNC: 5 MMOL/L (ref 5–15)
AST SERPL-CCNC: 42 U/L (ref 15–37)
BILIRUB SERPL-MCNC: 0.3 MG/DL (ref 0.2–1)
BUN SERPL-MCNC: 24 MG/DL (ref 6–20)
BUN/CREAT SERPL: 15 (ref 12–20)
CALCIUM SERPL-MCNC: 9.2 MG/DL (ref 8.5–10.1)
CHLORIDE SERPL-SCNC: 109 MMOL/L (ref 97–108)
CO2 SERPL-SCNC: 24 MMOL/L (ref 21–32)
CREAT SERPL-MCNC: 1.55 MG/DL (ref 0.55–1.02)
ERYTHROCYTE [DISTWIDTH] IN BLOOD BY AUTOMATED COUNT: 14.3 % (ref 11.5–14.5)
GLOBULIN SER CALC-MCNC: 3.5 G/DL (ref 2–4)
GLUCOSE BLD STRIP.AUTO-MCNC: 111 MG/DL (ref 65–117)
GLUCOSE BLD STRIP.AUTO-MCNC: 135 MG/DL (ref 65–117)
GLUCOSE BLD STRIP.AUTO-MCNC: 143 MG/DL (ref 65–117)
GLUCOSE BLD STRIP.AUTO-MCNC: 174 MG/DL (ref 65–117)
GLUCOSE SERPL-MCNC: 126 MG/DL (ref 65–100)
HCT VFR BLD AUTO: 28.7 % (ref 35–47)
HGB BLD-MCNC: 9.8 G/DL (ref 11.5–16)
MCH RBC QN AUTO: 27.5 PG (ref 26–34)
MCHC RBC AUTO-ENTMCNC: 34.1 G/DL (ref 30–36.5)
MCV RBC AUTO: 80.6 FL (ref 80–99)
NRBC # BLD: 0.02 K/UL (ref 0–0.01)
NRBC BLD-RTO: 0.1 PER 100 WBC
PLATELET # BLD AUTO: 294 K/UL (ref 150–400)
PMV BLD AUTO: 10.5 FL (ref 8.9–12.9)
POTASSIUM SERPL-SCNC: 3.8 MMOL/L (ref 3.5–5.1)
PROT SERPL-MCNC: 5.6 G/DL (ref 6.4–8.2)
RBC # BLD AUTO: 3.56 M/UL (ref 3.8–5.2)
SERVICE CMNT-IMP: ABNORMAL
SERVICE CMNT-IMP: NORMAL
SODIUM SERPL-SCNC: 138 MMOL/L (ref 136–145)
WBC # BLD AUTO: 18.4 K/UL (ref 3.6–11)

## 2021-07-04 PROCEDURE — 74011000258 HC RX REV CODE- 258: Performed by: HOSPITALIST

## 2021-07-04 PROCEDURE — 74011636637 HC RX REV CODE- 636/637: Performed by: HOSPITALIST

## 2021-07-04 PROCEDURE — 65270000029 HC RM PRIVATE

## 2021-07-04 PROCEDURE — 74011250636 HC RX REV CODE- 250/636: Performed by: STUDENT IN AN ORGANIZED HEALTH CARE EDUCATION/TRAINING PROGRAM

## 2021-07-04 PROCEDURE — 82962 GLUCOSE BLOOD TEST: CPT

## 2021-07-04 PROCEDURE — 74011250637 HC RX REV CODE- 250/637: Performed by: STUDENT IN AN ORGANIZED HEALTH CARE EDUCATION/TRAINING PROGRAM

## 2021-07-04 PROCEDURE — 85027 COMPLETE CBC AUTOMATED: CPT

## 2021-07-04 PROCEDURE — 80053 COMPREHEN METABOLIC PANEL: CPT

## 2021-07-04 PROCEDURE — 94760 N-INVAS EAR/PLS OXIMETRY 1: CPT

## 2021-07-04 PROCEDURE — 74011250637 HC RX REV CODE- 250/637: Performed by: HOSPITALIST

## 2021-07-04 PROCEDURE — 74011250636 HC RX REV CODE- 250/636: Performed by: HOSPITALIST

## 2021-07-04 RX ADMIN — ATORVASTATIN CALCIUM 10 MG: 10 TABLET, FILM COATED ORAL at 21:17

## 2021-07-04 RX ADMIN — ACETAMINOPHEN 650 MG: 325 TABLET ORAL at 02:30

## 2021-07-04 RX ADMIN — BENZTROPINE MESYLATE 0.5 MG: 1 TABLET ORAL at 17:27

## 2021-07-04 RX ADMIN — SODIUM CHLORIDE 100 ML/HR: 9 INJECTION, SOLUTION INTRAVENOUS at 19:59

## 2021-07-04 RX ADMIN — HEPARIN SODIUM 5000 UNITS: 5000 INJECTION INTRAVENOUS; SUBCUTANEOUS at 02:31

## 2021-07-04 RX ADMIN — Medication 10 ML: at 17:27

## 2021-07-04 RX ADMIN — SODIUM CHLORIDE 100 ML/HR: 9 INJECTION, SOLUTION INTRAVENOUS at 06:42

## 2021-07-04 RX ADMIN — ACETAMINOPHEN 650 MG: 325 TABLET ORAL at 17:26

## 2021-07-04 RX ADMIN — BENZTROPINE MESYLATE 0.5 MG: 1 TABLET ORAL at 08:10

## 2021-07-04 RX ADMIN — HEPARIN SODIUM 5000 UNITS: 5000 INJECTION INTRAVENOUS; SUBCUTANEOUS at 11:35

## 2021-07-04 RX ADMIN — PANTOPRAZOLE SODIUM 40 MG: 40 TABLET, DELAYED RELEASE ORAL at 06:42

## 2021-07-04 RX ADMIN — INSULIN LISPRO 2 UNITS: 100 INJECTION, SOLUTION INTRAVENOUS; SUBCUTANEOUS at 11:34

## 2021-07-04 RX ADMIN — INSULIN GLARGINE 15 UNITS: 100 INJECTION, SOLUTION SUBCUTANEOUS at 21:17

## 2021-07-04 RX ADMIN — HEPARIN SODIUM 5000 UNITS: 5000 INJECTION INTRAVENOUS; SUBCUTANEOUS at 17:26

## 2021-07-04 RX ADMIN — Medication 1 CAPSULE: at 08:10

## 2021-07-04 RX ADMIN — VERAPAMIL HYDROCHLORIDE 240 MG: 120 TABLET, FILM COATED, EXTENDED RELEASE ORAL at 08:09

## 2021-07-04 RX ADMIN — ACETAMINOPHEN 650 MG: 325 TABLET ORAL at 08:09

## 2021-07-04 RX ADMIN — RISPERIDONE 3 MG: 1 TABLET ORAL at 08:09

## 2021-07-04 RX ADMIN — CEFTRIAXONE SODIUM 2 G: 2 INJECTION, POWDER, FOR SOLUTION INTRAMUSCULAR; INTRAVENOUS at 21:17

## 2021-07-04 NOTE — PROGRESS NOTES
Problem: Falls - Risk of  Goal: *Absence of Falls  Description: Document Nadya Spare Fall Risk and appropriate interventions in the flowsheet. Outcome: Progressing Towards Goal  Note: Fall Risk Interventions:  Mobility Interventions: Patient to call before getting OOB    Mentation Interventions: Adequate sleep, hydration, pain control    Medication Interventions: Patient to call before getting OOB    Elimination Interventions: Call light in reach              Problem: Diabetes Self-Management  Goal: *Monitoring blood glucose, interpreting and using results  Description: Identify recommended blood glucose targets  and personal targets.   Outcome: Progressing Towards Goal

## 2021-07-04 NOTE — PROGRESS NOTES
[address] [facility] Adult  Hospitalist Group                                                                                          Hospitalist Progress Note  Delbert Elizabeth MD  Answering service: [phone] from in house phone        Date of Service:  2021  NAME:  Tonja Bagley  :  1955  MRN:  [id number]      Admission Summary:     Tonja Bagley is a 74 y.o. female with hx of NIDDM II, Dyslipidemia, Parkinson's, Mood disorder, PUD who presented to ed for confusion and poor po intake. Patient is seen and examined at bedside. She is alert and oriented to person and place but still mildly confused and provides limited history. States she has had persistent nausea over the last few days as well as lower abdominal pain. Per chart review, son reported that patient over the last 3 days has become increasingly confused. The patient denies any fever, chills, chest pain, cough, congestion, recent illness, palpitations, or dysuria. Interval history / Subjective:     She said she feels better,  no lower abdominal pain, nausea or vomiting     Assessment & Plan:     Severe sepsis secondary to acute cystitis POA  -on iv rocephin, IVF, tutu Q  -fever was 101.8 on , tachycardic, tachypenic, RR 33, leukocytosis, AMANDEEP, lactic acid 2.4 now improving to 1.6  -spike fever 101 on  , leukocytosis up 18. 4.  and tachycardia improving  -follow up urine cx    Bacteremia due to acute cystitis   -on iv ceftriaxone   -blood cx grow on  e coli, sensitive to ceftriaxone   -repeated blood cx  no growth so far    T2DM  -A1c 6.8  -continue lantus 15 units, sliding scale and monitor finger stick glucose   -home glimepiride on hold    AMANDEEP  -continue on normal saline   -creatinine improving, Cr 1.55 from 2.81  -avoid nephrotoxin   -monitor renal function     Anemia likely due to chronic disease   -stable, Hgb 9.8  -monitor H/H    Hypokalemia  -replaced and resolved    Hx of HTN  -BP fairly controlled, continue verapamil , monitor BP    Mood disorder   -stable, continue on cogentin and risperidone    Dyslipidemia   -stable, continue lipitor    Hx of PUD  -stable, continue protonix    Acute metabolic encephalopathy  -improved  -conscious and well oriented    Hx of Parkinson's disease   -not on medication  -PT/OT    Code status: Full Code  DVT prophylaxis: heparin    Care Plan discussed with: Patient/Family, Nurse and   Anticipated Disposition: Home w/Family  Anticipated Discharge: Greater than 48 hours     Hospital Problems  Date Reviewed: 10/5/2016        Codes Class Noted POA    Sepsis ([facility] [address] ICD-10-CM: A41.9  ICD-9-CM: 038.9, 995.91  6/30/2021 Unknown                 Vital Signs:    Last 24hrs VS reviewed since prior progress note. Most recent are:  Visit Vitals  BP (!) 154/84 (BP 1 Location: Right arm, BP Patient Position: At rest)   Pulse 86   Temp 98.4 °F (36.9 °C)   Resp 17   Wt 98.3 kg (216 lb 11.2 oz)   SpO2 95%   BMI 38.39 kg/m²       No intake or output data in the 24 hours ending 07/04/21 1009     Physical Examination:     I had a face to face encounter with this patient and independently examined them on 7/4/2021 as outlined below:          Constitutional:  No acute distress, cooperative, pleasant    ENT:  Oral mucosa moist, oropharynx benign. Resp:  CTA bilaterally. No wheezing/rhonchi/rales. No accessory muscle use   CV:  Regular rhythm, normal rate, no murmurs, gallops, rubs    GI:  Soft, non distended, non tender.  normoactive bowel sounds, no hepatosplenomegaly     Musculoskeletal:  No edema,     Neurologic:  Conscious and alert, well oriented, moves all extremities, CN II-XII grossly intact            Data Review:    Review and/or order of clinical lab test  Review and/or order of tests in the radiology section of CPT  Review and/or order of tests in the medicine section of CPT      Labs:     Recent Labs     07/04/21  0213 07/03/21  0332   WBC 18.4* 16.0*   HGB 9.8* 9.6* HCT 28.7* 28.7*    257     Recent Labs     07/04/21 0213 07/03/21  0332 07/02/21  0512    136 136   K 3.8 3.5 3.3*   * 108 106   CO2 24 22 22   BUN 24* 33* 41*   CREA 1.55* 1.67* 1.86*   * 115* 115*   CA 9.2 9.4 9.5   MG  --  2.1  --    PHOS  --  2.7  --      Recent Labs     07/04/21 0213 07/03/21 0332 07/02/21 0512   ALT 59 61 51   * 124* 130*   TBILI 0.3 0.3 0.7   TP 5.6* 6.4 6.8   ALB 2.1* 2.0* 2.2*   GLOB 3.5 4.4* 4.6*     No results for input(s): INR, PTP, APTT, INREXT, INREXT in the last 72 hours. No results for input(s): FE, TIBC, PSAT, FERR in the last 72 hours. No results found for: FOL, RBCF   No results for input(s): PH, PCO2, PO2 in the last 72 hours. No results for input(s): CPK, CKNDX, TROIQ in the last 72 hours.     No lab exists for component: CPKMB  No results found for: CHOL, CHOLX, CHLST, CHOLV, HDL, HDLP, LDL, LDLC, DLDLP, TGLX, TRIGL, TRIGP, CHHD, CHHDX  Lab Results   Component Value Date/Time    Glucose (POC) 111 07/04/2021 06:25 AM    Glucose (POC) 136 (H) 07/03/2021 09:12 PM    Glucose (POC) 158 (H) 07/03/2021 04:15 PM    Glucose (POC) 199 (H) 07/03/2021 12:18 PM    Glucose (POC) 147 (H) 07/03/2021 08:58 AM     Lab Results   Component Value Date/Time    Color YELLOW/STRAW 06/30/2021 06:58 PM    Appearance CLOUDY (A) 06/30/2021 06:58 PM    Specific gravity 1.025 06/30/2021 06:58 PM    pH (UA) 5.5 06/30/2021 06:58 PM    Protein 100 (A) 06/30/2021 06:58 PM    Glucose Negative 06/30/2021 06:58 PM    Ketone TRACE (A) 06/30/2021 06:58 PM    Urobilinogen 1.0 06/30/2021 06:58 PM    Nitrites Negative 06/30/2021 06:58 PM    Leukocyte Esterase MODERATE (A) 06/30/2021 06:58 PM    Epithelial cells FEW 06/30/2021 06:58 PM    Bacteria 3+ (A) 06/30/2021 06:58 PM    WBC  06/30/2021 06:58 PM    RBC 0-5 06/30/2021 06:58 PM         Medications Reviewed:     Current Facility-Administered Medications   Medication Dose Route Frequency    benztropine (COGENTIN) tablet 0.5 mg  0.5 mg Oral BID    risperiDONE (RisperDAL) tablet 3 mg  3 mg Oral DAILY    pantoprazole (PROTONIX) tablet 40 mg  40 mg Oral ACB    verapamil ER (CALAN-SR) tablet 240 mg  240 mg Oral DAILY WITH BREAKFAST    atorvastatin (LIPITOR) tablet 10 mg  10 mg Oral QHS    sodium chloride (NS) flush 5-40 mL  5-40 mL IntraVENous Q8H    sodium chloride (NS) flush 5-40 mL  5-40 mL IntraVENous PRN    acetaminophen (TYLENOL) tablet 650 mg  650 mg Oral Q6H PRN    Or    acetaminophen (TYLENOL) suppository 650 mg  650 mg Rectal Q6H PRN    polyethylene glycol (MIRALAX) packet 17 g  17 g Oral DAILY PRN    ondansetron (ZOFRAN ODT) tablet 4 mg  4 mg Oral Q8H PRN    Or    ondansetron (ZOFRAN) injection 4 mg  4 mg IntraVENous Q6H PRN    0.9% sodium chloride infusion  100 mL/hr IntraVENous CONTINUOUS    heparin (porcine) injection 5,000 Units  5,000 Units SubCUTAneous Q8H    insulin glargine (LANTUS) injection 15 Units  15 Units SubCUTAneous QHS    insulin lispro (HUMALOG) injection   SubCUTAneous AC&HS    glucose chewable tablet 16 g  4 Tablet Oral PRN    dextrose (D50W) injection syrg 12.5-25 g  12.5-25 g IntraVENous PRN    glucagon (GLUCAGEN) injection 1 mg  1 mg IntraMUSCular PRN    L.acidophilus-paracasei-S.thermophil-bifidobacter (RISAQUAD) 8 billion cell capsule  1 Capsule Oral DAILY    cefTRIAXone (ROCEPHIN) 2 g in 0.9% sodium chloride (MBP/ADV) 50 mL MBP  2 g IntraVENous Q24H     ______________________________________________________________________  EXPECTED LENGTH OF STAY: 4d 19h  ACTUAL LENGTH OF STAY:          4                 Pratima España MD

## 2021-07-05 ENCOUNTER — APPOINTMENT (OUTPATIENT)
Dept: CT IMAGING | Age: 66
DRG: 871 | End: 2021-07-05
Attending: INTERNAL MEDICINE
Payer: MEDICARE

## 2021-07-05 ENCOUNTER — APPOINTMENT (OUTPATIENT)
Dept: GENERAL RADIOLOGY | Age: 66
DRG: 871 | End: 2021-07-05
Attending: INTERNAL MEDICINE
Payer: MEDICARE

## 2021-07-05 LAB
ANION GAP SERPL CALC-SCNC: 9 MMOL/L (ref 5–15)
BUN SERPL-MCNC: 15 MG/DL (ref 6–20)
BUN/CREAT SERPL: 13 (ref 12–20)
CALCIUM SERPL-MCNC: 8.6 MG/DL (ref 8.5–10.1)
CHLORIDE SERPL-SCNC: 110 MMOL/L (ref 97–108)
CO2 SERPL-SCNC: 24 MMOL/L (ref 21–32)
CREAT SERPL-MCNC: 1.18 MG/DL (ref 0.55–1.02)
ERYTHROCYTE [DISTWIDTH] IN BLOOD BY AUTOMATED COUNT: 14.5 % (ref 11.5–14.5)
GLUCOSE BLD STRIP.AUTO-MCNC: 128 MG/DL (ref 65–117)
GLUCOSE BLD STRIP.AUTO-MCNC: 134 MG/DL (ref 65–117)
GLUCOSE BLD STRIP.AUTO-MCNC: 135 MG/DL (ref 65–117)
GLUCOSE BLD STRIP.AUTO-MCNC: 158 MG/DL (ref 65–117)
GLUCOSE SERPL-MCNC: 117 MG/DL (ref 65–100)
HCT VFR BLD AUTO: 26.2 % (ref 35–47)
HGB BLD-MCNC: 8.8 G/DL (ref 11.5–16)
MCH RBC QN AUTO: 27.2 PG (ref 26–34)
MCHC RBC AUTO-ENTMCNC: 33.6 G/DL (ref 30–36.5)
MCV RBC AUTO: 80.9 FL (ref 80–99)
NRBC # BLD: 0.05 K/UL (ref 0–0.01)
NRBC BLD-RTO: 0.2 PER 100 WBC
PLATELET # BLD AUTO: 318 K/UL (ref 150–400)
PMV BLD AUTO: 10.2 FL (ref 8.9–12.9)
POTASSIUM SERPL-SCNC: 3.2 MMOL/L (ref 3.5–5.1)
RBC # BLD AUTO: 3.24 M/UL (ref 3.8–5.2)
SERVICE CMNT-IMP: ABNORMAL
SODIUM SERPL-SCNC: 143 MMOL/L (ref 136–145)
WBC # BLD AUTO: 20.3 K/UL (ref 3.6–11)

## 2021-07-05 PROCEDURE — 82962 GLUCOSE BLOOD TEST: CPT

## 2021-07-05 PROCEDURE — 74011250637 HC RX REV CODE- 250/637: Performed by: HOSPITALIST

## 2021-07-05 PROCEDURE — 94760 N-INVAS EAR/PLS OXIMETRY 1: CPT

## 2021-07-05 PROCEDURE — 71045 X-RAY EXAM CHEST 1 VIEW: CPT

## 2021-07-05 PROCEDURE — 74011250636 HC RX REV CODE- 250/636: Performed by: HOSPITALIST

## 2021-07-05 PROCEDURE — 36415 COLL VENOUS BLD VENIPUNCTURE: CPT

## 2021-07-05 PROCEDURE — 65270000029 HC RM PRIVATE

## 2021-07-05 PROCEDURE — 80048 BASIC METABOLIC PNL TOTAL CA: CPT

## 2021-07-05 PROCEDURE — 74176 CT ABD & PELVIS W/O CONTRAST: CPT

## 2021-07-05 PROCEDURE — 74011000258 HC RX REV CODE- 258: Performed by: INTERNAL MEDICINE

## 2021-07-05 PROCEDURE — 74011250637 HC RX REV CODE- 250/637: Performed by: STUDENT IN AN ORGANIZED HEALTH CARE EDUCATION/TRAINING PROGRAM

## 2021-07-05 PROCEDURE — 74011636637 HC RX REV CODE- 636/637: Performed by: HOSPITALIST

## 2021-07-05 PROCEDURE — 85027 COMPLETE CBC AUTOMATED: CPT

## 2021-07-05 PROCEDURE — 74011250636 HC RX REV CODE- 250/636: Performed by: INTERNAL MEDICINE

## 2021-07-05 RX ORDER — POTASSIUM CHLORIDE 750 MG/1
40 TABLET, FILM COATED, EXTENDED RELEASE ORAL EVERY 4 HOURS
Status: COMPLETED | OUTPATIENT
Start: 2021-07-05 | End: 2021-07-05

## 2021-07-05 RX ADMIN — PIPERACILLIN AND TAZOBACTAM 3.38 G: 3; .375 INJECTION, POWDER, LYOPHILIZED, FOR SOLUTION INTRAVENOUS at 14:37

## 2021-07-05 RX ADMIN — PANTOPRAZOLE SODIUM 40 MG: 40 TABLET, DELAYED RELEASE ORAL at 06:33

## 2021-07-05 RX ADMIN — PIPERACILLIN AND TAZOBACTAM 3.38 G: 3; .375 INJECTION, POWDER, LYOPHILIZED, FOR SOLUTION INTRAVENOUS at 22:57

## 2021-07-05 RX ADMIN — Medication 1 CAPSULE: at 07:56

## 2021-07-05 RX ADMIN — INSULIN LISPRO 2 UNITS: 100 INJECTION, SOLUTION INTRAVENOUS; SUBCUTANEOUS at 16:39

## 2021-07-05 RX ADMIN — BENZTROPINE MESYLATE 0.5 MG: 1 TABLET ORAL at 07:57

## 2021-07-05 RX ADMIN — BENZTROPINE MESYLATE 0.5 MG: 1 TABLET ORAL at 18:17

## 2021-07-05 RX ADMIN — RISPERIDONE 3 MG: 1 TABLET ORAL at 07:55

## 2021-07-05 RX ADMIN — SODIUM CHLORIDE 100 ML/HR: 9 INJECTION, SOLUTION INTRAVENOUS at 08:08

## 2021-07-05 RX ADMIN — POTASSIUM CHLORIDE 40 MEQ: 750 TABLET, FILM COATED, EXTENDED RELEASE ORAL at 07:54

## 2021-07-05 RX ADMIN — ACETAMINOPHEN 650 MG: 325 TABLET ORAL at 16:39

## 2021-07-05 RX ADMIN — INSULIN GLARGINE 15 UNITS: 100 INJECTION, SOLUTION SUBCUTANEOUS at 22:58

## 2021-07-05 RX ADMIN — Medication 10 ML: at 13:51

## 2021-07-05 RX ADMIN — ATORVASTATIN CALCIUM 10 MG: 10 TABLET, FILM COATED ORAL at 23:02

## 2021-07-05 RX ADMIN — POTASSIUM CHLORIDE 40 MEQ: 750 TABLET, FILM COATED, EXTENDED RELEASE ORAL at 11:35

## 2021-07-05 RX ADMIN — VERAPAMIL HYDROCHLORIDE 240 MG: 120 TABLET, FILM COATED, EXTENDED RELEASE ORAL at 07:56

## 2021-07-05 NOTE — PROGRESS NOTES
[facility] [facility] Adult  Hospitalist Group                                                                                          Hospitalist Progress Note  Baron Calle MD  Answering service: [phone] from in house phone        Date of Service:  2021  NAME:  Destinee Zamora  :  1955  MRN:  [id number]      Admission Summary:     Destinee Zamora is a 74 y.o. female with hx of NIDDM II, Dyslipidemia, Parkinson's, Mood disorder, PUD who presented to ed for confusion and poor po intake. Patient is seen and examined at bedside. She is alert and oriented to person and place but still mildly confused and provides limited history. States she has had persistent nausea over the last few days as well as lower abdominal pain. Per chart review, son reported that patient over the last 3 days has become increasingly confused. The patient denies any fever, chills, chest pain, cough, congestion, recent illness, palpitations, or dysuria.     Interval history / Subjective:     She said she feels better,  no lower abdominal pain, nausea or vomiting     Assessment & Plan:     Severe sepsis secondary to acute cystitis POA  -on iv rocephin, IVF, tutu Q  -fever improving, last fever was 101 on , temp 101.8 on , had tachycardic, tachypenic, RR 33, leukocytosis, AMANDEEP, lactic acid 2.4 now improving to 1.6  -leukocytosis trending up but tachycardia improving  -urine cx E Coli  -blood cx grow on  e coli, sensitive to ceftriaxone   -repeated blood cx  no growth so far  -consult to ID     T2DM  -A1c 6.8  -continue lantus 15 units, sliding scale and monitor finger stick glucose   -home glimepiride on hold    AMANDEEP  -continue on normal saline   -creatinine improving, Cr 1.18 from 2.81, cut back IVF to 50 ml/hr   -avoid nephrotoxin   -monitor renal function     Anemia likely due to chronic disease   -stable, Hgb 9.8  -monitor H/H    Hypokalemia  -replaced and resolved    HTN  -BP fairly controlled, continue verapamil , monitor BP    Mood disorder   -stable, continue on cogentin and risperidone    Dyslipidemia   -stable, continue lipitor    Hx of PUD  -stable, continue protonix    Acute metabolic encephalopathy  -improved  -conscious and well oriented    Hx of Parkinson's disease   -not on medication  -PT/OT    Code status: Full Code  DVT prophylaxis: heparin    Care Plan discussed with: Patient/Family, Nurse and   Anticipated Disposition: Home with [facility], PT/OT  Anticipated Discharge: Greater than 48 hours     Hospital Problems  Date Reviewed: 10/5/2016        Codes Class Noted POA    Sepsis ([facility] [address] ICD-10-CM: A41.9  ICD-9-CM: 038.9, 995.91  6/30/2021 Unknown                 Vital Signs:    Last 24hrs VS reviewed since prior progress note. Most recent are:  Visit Vitals  BP (!) 150/78   Pulse 88   Temp 98.3 °F (36.8 °C)   Resp 15   Wt 98.3 kg (216 lb 11.2 oz)   SpO2 93%   BMI 38.39 kg/m²       No intake or output data in the 24 hours ending 07/05/21 1023     Physical Examination:     I had a face to face encounter with this patient and independently examined them on 7/5/2021 as outlined below:          Constitutional:  No acute distress, cooperative, pleasant    ENT:  Oral mucosa moist, oropharynx benign. Resp:  CTA bilaterally. No wheezing/rhonchi/rales. No accessory muscle use   CV:  Regular rhythm, normal rate, no murmurs, gallops, rubs    GI:  Soft, non distended, non tender.  normoactive bowel sounds, no hepatosplenomegaly     Musculoskeletal:  No edema,     Neurologic:  Conscious and alert, well oriented, moves all extremities, CN II-XII grossly intact            Data Review:    Review and/or order of clinical lab test  Review and/or order of tests in the radiology section of CPT  Review and/or order of tests in the medicine section of CPT      Labs:     Recent Labs     07/05/21  0634 07/04/21  0213   WBC 20.3* 18.4*   HGB 8.8* 9.8*   HCT 26.2* 28.7*    294     Recent Labs 07/05/21 0634 07/04/21 0213 07/03/21 0332    138 136   K 3.2* 3.8 3.5   * 109* 108   CO2 24 24 22   BUN 15 24* 33*   CREA 1.18* 1.55* 1.67*   * 126* 115*   CA 8.6 9.2 9.4   MG  --   --  2.1   PHOS  --   --  2.7     Recent Labs     07/04/21 0213 07/03/21 0332   ALT 59 61   * 124*   TBILI 0.3 0.3   TP 5.6* 6.4   ALB 2.1* 2.0*   GLOB 3.5 4.4*     No results for input(s): INR, PTP, APTT, INREXT, INREXT in the last 72 hours. No results for input(s): FE, TIBC, PSAT, FERR in the last 72 hours. No results found for: FOL, RBCF   No results for input(s): PH, PCO2, PO2 in the last 72 hours. No results for input(s): CPK, CKNDX, TROIQ in the last 72 hours.     No lab exists for component: CPKMB  No results found for: CHOL, CHOLX, CHLST, CHOLV, HDL, HDLP, LDL, LDLC, DLDLP, TGLX, TRIGL, TRIGP, CHHD, CHHDX  Lab Results   Component Value Date/Time    Glucose (POC) 128 (H) 07/05/2021 07:08 AM    Glucose (POC) 143 (H) 07/04/2021 09:17 PM    Glucose (POC) 135 (H) 07/04/2021 03:57 PM    Glucose (POC) 174 (H) 07/04/2021 11:02 AM    Glucose (POC) 111 07/04/2021 06:25 AM     Lab Results   Component Value Date/Time    Color YELLOW/STRAW 06/30/2021 06:58 PM    Appearance CLOUDY (A) 06/30/2021 06:58 PM    Specific gravity 1.025 06/30/2021 06:58 PM    pH (UA) 5.5 06/30/2021 06:58 PM    Protein 100 (A) 06/30/2021 06:58 PM    Glucose Negative 06/30/2021 06:58 PM    Ketone TRACE (A) 06/30/2021 06:58 PM    Urobilinogen 1.0 06/30/2021 06:58 PM    Nitrites Negative 06/30/2021 06:58 PM    Leukocyte Esterase MODERATE (A) 06/30/2021 06:58 PM    Epithelial cells FEW 06/30/2021 06:58 PM    Bacteria 3+ (A) 06/30/2021 06:58 PM    WBC  06/30/2021 06:58 PM    RBC 0-5 06/30/2021 06:58 PM         Medications Reviewed:     Current Facility-Administered Medications   Medication Dose Route Frequency    potassium chloride SR (KLOR-CON 10) tablet 40 mEq  40 mEq Oral Q4H    benztropine (COGENTIN) tablet 0.5 mg  0.5 mg Oral BID    risperiDONE (RisperDAL) tablet 3 mg  3 mg Oral DAILY    pantoprazole (PROTONIX) tablet 40 mg  40 mg Oral ACB    verapamil ER (CALAN-SR) tablet 240 mg  240 mg Oral DAILY WITH BREAKFAST    atorvastatin (LIPITOR) tablet 10 mg  10 mg Oral QHS    sodium chloride (NS) flush 5-40 mL  5-40 mL IntraVENous Q8H    sodium chloride (NS) flush 5-40 mL  5-40 mL IntraVENous PRN    acetaminophen (TYLENOL) tablet 650 mg  650 mg Oral Q6H PRN    Or    acetaminophen (TYLENOL) suppository 650 mg  650 mg Rectal Q6H PRN    polyethylene glycol (MIRALAX) packet 17 g  17 g Oral DAILY PRN    ondansetron (ZOFRAN ODT) tablet 4 mg  4 mg Oral Q8H PRN    Or    ondansetron (ZOFRAN) injection 4 mg  4 mg IntraVENous Q6H PRN    0.9% sodium chloride infusion  100 mL/hr IntraVENous CONTINUOUS    heparin (porcine) injection 5,000 Units  5,000 Units SubCUTAneous Q8H    insulin glargine (LANTUS) injection 15 Units  15 Units SubCUTAneous QHS    insulin lispro (HUMALOG) injection   SubCUTAneous AC&HS    glucose chewable tablet 16 g  4 Tablet Oral PRN    dextrose (D50W) injection syrg 12.5-25 g  12.5-25 g IntraVENous PRN    glucagon (GLUCAGEN) injection 1 mg  1 mg IntraMUSCular PRN    L.acidophilus-paracasei-S.thermophil-bifidobacter (RISAQUAD) 8 billion cell capsule  1 Capsule Oral DAILY    cefTRIAXone (ROCEPHIN) 2 g in 0.9% sodium chloride (MBP/ADV) 50 mL MBP  2 g IntraVENous Q24H     ______________________________________________________________________  EXPECTED LENGTH OF STAY: 4d 19h  ACTUAL LENGTH OF STAY:          5                 Karine Crespo MD

## 2021-07-05 NOTE — PROGRESS NOTES
FREDY:  RUR:  15%    Disposition:  Home    CM made attempt to visit patient for assessment. Patient is off the floor. CM spoke with Dr. Kendra Donato earlier today. Patient's WBC trending up. Patient is being followed by ID (Dr. Benito Cooper). Patient states she has a letter from [facility] and is asking for explanation.     Rice Sauers, [address], [address]

## 2021-07-05 NOTE — CONSULTS
ID Consult Note  NAME:  Destinee Zamora   :   1955   MRN:   [id number]   Date/Time:  2021 1:37 PM  Subjective:   REASON FOR CONSULT:   Bacteremia  Dorothea Arreola is a 74 y.o. with a history of breast cancer, diabetes, parkinsonism's and hypertension. She really cannot say why she came to the hospital.  According to the admitting note, the patient came in for confusion. Her son said that 3 days prior to getting admitted, there has been alteration in her mental status which was worsening. She apparently told the admitting doctor that she had nausea and some abdominal pain. She was diagnosed to have a urinary tract infection and bacteremia both with E. coli. She is on ceftriaxone. Despite this, she has continued to have fevers and her white count is trending up. She also came in with acute renal failure which is now improving. She tells me that she does not have any abdominal pain, dyspnea, but she does say that she has a slight cough. He does not have any back pain, rash, joint pains. She does not have any diarrhea. We are now being asked to see her in consult. Past Medical History:   Diagnosis Date    Cancer ([address] 2015    BREAST right X2    Diabetes ([address]     TYPE II    Headache     AWOKE WITH HEADACHE TODAY; HAS HEADACHE ALMOST EVERY DAY, SHE STATES; RELIEVED BY TYLENOL.     Hypertension     Parkinsonism ([address]     Psychiatric disorder     TAKES RISPERIDONE, PT STATES, FOR \"MOOD\"    PUD (peptic ulcer disease)     peptic ulcer; no bleeding    Shortness of breath       Past Surgical History:   Procedure Laterality Date    HX BREAST LUMPECTOMY Left 2016     BREAST    HX BREAST RECONSTRUCTION Left 2016    REMOVE TISSUE EXPANDER,PLACEMENT OF IMPLANT LEFT BREAST,REVISE LEFT RECONSTRUCTED BREAST,LIPOSUCTION OF LEFT BREAST, RIGHT MASTOPEXY FOR SYMMETRY performed by Rose Floyd MD at [address] HX BREAST RECONSTRUCTION Left 10/5/2016    LEFT LAUREN Elizondo RECONSTRUCTION WITH LEFT GROIN SKIN GRAFT, EXCISION TISSUE LEFT BREAST  performed by Ewa Wolfe MD at [address] [facility]X HYSTERECTOMY      TOTAL    HX OTHER SURGICAL      SEBACEOUS CYST REMOVAL UPPER BACK     Social History     Tobacco Use    Smoking status: Former Smoker    Smokeless tobacco: Never Used    Tobacco comment: WAS OCC. SMOKER WHEN SMOKED; QUIT 5 YEARS AGO OR SO, SHE STATES ON 9/21/16. Substance Use Topics    Alcohol use: Yes     Comment: socially      Family History   Problem Relation Age of Onset    Heart Disease Mother     Pacemaker Mother     Alzheimer Father     No Known Problems Sister     Stroke Brother     No Known Problems Sister     No Known Problems Sister     No Known Problems Sister     No Known Problems Sister     No Known Problems Brother     No Known Problems Brother     Anesth Problems Neg Hx       No Known Allergies   Home Medications:  Prior to Admission Medications   Prescriptions Last Dose Informant Patient Reported? Taking?   benztropine (COGENTIN) 0.5 mg tablet  Self Yes Yes   Sig: Take 0.5 mg by mouth two (2) times a day. Indications: a type of movement disorder called parkinsonism   glimepiride (AMARYL) 2 mg tablet  Self Yes Yes   Sig: Take 2 mg by mouth two (2) times a day. indapamide (LOZOL) 1.25 mg tablet  Self Yes Yes   Sig: Take 1.25 mg by mouth daily. lovastatin (MEVACOR) 20 mg tablet  Self Yes Yes   Sig: Take 20 mg by mouth nightly. omeprazole (PRILOSEC) 20 mg capsule  Self Yes Yes   Sig: Take 20 mg by mouth daily. risperiDONE (RISPERDAL) 3 mg tablet  Self Yes Yes   Sig: Take 3 mg by mouth daily. verapamil ER (CALAN-SR) 240 mg CR tablet  Self Yes Yes   Sig: Take 240 mg by mouth daily.       Facility-Administered Medications: None     Hospital medications:  Current Facility-Administered Medications   Medication Dose Route Frequency    benztropine (COGENTIN) tablet 0.5 mg  0.5 mg Oral BID    risperiDONE (RisperDAL) tablet 3 mg  3 mg Oral DAILY    pantoprazole (PROTONIX) tablet 40 mg  40 mg Oral ACB    verapamil ER (CALAN-SR) tablet 240 mg  240 mg Oral DAILY WITH BREAKFAST    atorvastatin (LIPITOR) tablet 10 mg  10 mg Oral QHS    sodium chloride (NS) flush 5-40 mL  5-40 mL IntraVENous Q8H    sodium chloride (NS) flush 5-40 mL  5-40 mL IntraVENous PRN    acetaminophen (TYLENOL) tablet 650 mg  650 mg Oral Q6H PRN    Or    acetaminophen (TYLENOL) suppository 650 mg  650 mg Rectal Q6H PRN    polyethylene glycol (MIRALAX) packet 17 g  17 g Oral DAILY PRN    ondansetron (ZOFRAN ODT) tablet 4 mg  4 mg Oral Q8H PRN    Or    ondansetron (ZOFRAN) injection 4 mg  4 mg IntraVENous Q6H PRN    0.9% sodium chloride infusion  50 mL/hr IntraVENous CONTINUOUS    heparin (porcine) injection 5,000 Units  5,000 Units SubCUTAneous Q8H    insulin glargine (LANTUS) injection 15 Units  15 Units SubCUTAneous QHS    insulin lispro (HUMALOG) injection   SubCUTAneous AC&HS    glucose chewable tablet 16 g  4 Tablet Oral PRN    dextrose (D50W) injection syrg 12.5-25 g  12.5-25 g IntraVENous PRN    glucagon (GLUCAGEN) injection 1 mg  1 mg IntraMUSCular PRN    L.acidophilus-paracasei-S.thermophil-bifidobacter (RISAQUAD) 8 billion cell capsule  1 Capsule Oral DAILY    cefTRIAXone (ROCEPHIN) 2 g in 0.9% sodium chloride (MBP/ADV) 50 mL MBP  2 g IntraVENous Q24H     REVIEW OF SYSTEMS:        Const:    negative weight loss  Eyes:   negative diplopia or visual changes, negative eye pain  ENT:   negative coryza, negative sore throat  Resp:   negative cough, hemoptysis, dyspnea  Cards:  negative for chest pain, palpitations  :  negative for dysuria  GI:   Negative for hematemesis and hematochezia  Skin:   negative for rash and pruritus  Heme:   negative for easy bruising and gum/nose bleeding  MS:  negative for myalgias, arthralgias  Neurolo:  negative for headaches, dizziness  Psych:  negative for hallucinations        Objective:   VITALS:    Visit Vitals  BP (!) 150/78   Pulse 88   Temp 98.3 °F (36.8 °C)   Resp 15   Wt 98.3 kg (216 lb 11.2 oz)   SpO2 93%   BMI 38.39 kg/m²     Temp (24hrs), Av.3 °F (36.8 °C), Min:98.1 °F (36.7 °C), Max:98.5 °F (36.9 °C)    PHYSICAL EXAM:   General:    Alert, cooperative, no distress, appears stated age. Head:   Normocephalic, without obvious abnormality, atraumatic. Eyes:   Conjunctivae clear, anicteric sclerae. Nose:  Nares normal.   Throat:    Lips and tongue normal.  No Thrush  Neck:  Supple, symmetrical    no carotid bruit and no JVD. :    No CVA tenderness, no mariscal catheter  Lungs:   Clear to auscultation bilaterally. No Wheezing or Rhonchi. No rales. Heart:   Regular rate and rhythm,  no murmur, rub or gallop. Abdomen:   Soft, non-tender,not distended. Bowel sounds normal.   Extremities: Knees, ankles, wrists, elbows are not warm and not tender. Skin:     No rashes or lesions.   Not Jaundiced  Lymph: Cervical normal  Neurologic: Full use of extraocular muscles, no facial asymmetry, tongue midline muscle strength 5 out of 5  Psychiatric: Affect normal    LAB DATA REVIEWED:    Recent Results (from the past 48 hour(s))   GLUCOSE, POC    Collection Time: 21  4:15 PM   Result Value Ref Range    Glucose (POC) 158 (H) 65 - 117 mg/dL    Performed by [address] [place] [address], POC    Collection Time: 21  9:12 PM   Result Value Ref Range    Glucose (POC) 136 (H) 65 - 117 mg/dL    Performed by Kassie Ramey PCT    CBC W/O DIFF    Collection Time: 21  2:13 AM   Result Value Ref Range    WBC 18.4 (H) 3.6 - 11.0 K/uL    RBC 3.56 (L) 3.80 - 5.20 M/uL    HGB 9.8 (L) 11.5 - 16.0 g/dL    HCT 28.7 (L) 35.0 - 47.0 %    MCV 80.6 80.0 - 99.0 FL    MCH 27.5 26.0 - 34.0 PG    MCHC 34.1 30.0 - 36.5 g/dL    RDW 14.3 11.5 - 14.5 %    PLATELET 294 575 - 196 K/uL    MPV 10.5 8.9 - 12.9 FL    NRBC 0.1 (H) 0  WBC    ABSOLUTE NRBC 0.02 (H) 0.00 - 0.89 K/uL   METABOLIC PANEL, COMPREHENSIVE    Collection Time: 07/04/21  2:13 AM   Result Value Ref Range    Sodium 138 136 - 145 mmol/L    Potassium 3.8 3.5 - 5.1 mmol/L    Chloride 109 (H) 97 - 108 mmol/L    CO2 24 21 - 32 mmol/L    Anion gap 5 5 - 15 mmol/L    Glucose 126 (H) 65 - 100 mg/dL    BUN 24 (H) 6 - 20 MG/DL    Creatinine 1.55 (H) 0.55 - 1.02 MG/DL    BUN/Creatinine ratio 15 12 - 20      GFR est AA 40 (L) >60 ml/min/1.73m2    GFR est non-AA 33 (L) >60 ml/min/1.73m2    Calcium 9.2 8.5 - 10.1 MG/DL    Bilirubin, total 0.3 0.2 - 1.0 MG/DL    ALT (SGPT) 59 12 - 78 U/L    AST (SGOT) 42 (H) 15 - 37 U/L    Alk.  phosphatase 130 (H) 45 - 117 U/L    Protein, total 5.6 (L) 6.4 - 8.2 g/dL    Albumin 2.1 (L) 3.5 - 5.0 g/dL    Globulin 3.5 2.0 - 4.0 g/dL    A-G Ratio 0.6 (L) 1.1 - 2.2     GLUCOSE, POC    Collection Time: 07/04/21  6:25 AM   Result Value Ref Range    Glucose (POC) 111 65 - 117 mg/dL    Performed by Italo Ramey PCT    GLUCOSE, POC    Collection Time: 07/04/21 11:02 AM   Result Value Ref Range    Glucose (POC) 174 (H) 65 - 117 mg/dL    Performed by [address] [place] [address], POC    Collection Time: 07/04/21  3:57 PM   Result Value Ref Range    Glucose (POC) 135 (H) 65 - 117 mg/dL    Performed by [address] [place] [address], POC    Collection Time: 07/04/21  9:17 PM   Result Value Ref Range    Glucose (POC) 143 (H) 65 - 117 mg/dL    Performed by Alison FORBES ([facility])    CBC W/O DIFF    Collection Time: 07/05/21  6:34 AM   Result Value Ref Range    WBC 20.3 (H) 3.6 - 11.0 K/uL    RBC 3.24 (L) 3.80 - 5.20 M/uL    HGB 8.8 (L) 11.5 - 16.0 g/dL    HCT 26.2 (L) 35.0 - 47.0 %    MCV 80.9 80.0 - 99.0 FL    MCH 27.2 26.0 - 34.0 PG    MCHC 33.6 30.0 - 36.5 g/dL    RDW 14.5 11.5 - 14.5 %    PLATELET 643 877 - 518 K/uL    MPV 10.2 8.9 - 12.9 FL    NRBC 0.2 (H) 0  WBC    ABSOLUTE NRBC 0.05 (H) 0.00 - 8.72 K/uL   METABOLIC PANEL, BASIC    Collection Time: 07/05/21  6:34 AM   Result Value Ref Range    Sodium 143 136 - 145 mmol/L    Potassium 3.2 (L) 3.5 - 5.1 mmol/L Chloride 110 (H) 97 - 108 mmol/L    CO2 24 21 - 32 mmol/L    Anion gap 9 5 - 15 mmol/L    Glucose 117 (H) 65 - 100 mg/dL    BUN 15 6 - 20 MG/DL    Creatinine 1.18 (H) 0.55 - 1.02 MG/DL    BUN/Creatinine ratio 13 12 - 20      GFR est AA 55 (L) >60 ml/min/1.73m2    GFR est non-AA 46 (L) >60 ml/min/1.73m2    Calcium 8.6 8.5 - 10.1 MG/DL   GLUCOSE, POC    Collection Time: 07/05/21  7:08 AM   Result Value Ref Range    Glucose (POC) 128 (H) 65 - 117 mg/dL    Performed by EBONI Langston    GLUCOSE, POC    Collection Time: 07/05/21 11:09 AM   Result Value Ref Range    Glucose (POC) 134 (H) 65 - 117 mg/dL    Performed by [facility]          IMPRESSION    #1 E. coli bacteremia    #2 E. coli UTI    #3 leukocytosis    #4 diabetes    #5 history of breast cancer    #6 acute renal failurebetter    #7 parkinsonism            PLAN    Change ceftriaxone to Zosyn    CT scan of the abdomen    Get cxr                    ___________________________________________________  ID: Lucian Guerrero MD

## 2021-07-06 LAB
ALBUMIN SERPL-MCNC: 2.3 G/DL (ref 3.5–5)
ALBUMIN/GLOB SERPL: 0.5 {RATIO} (ref 1.1–2.2)
ALP SERPL-CCNC: 121 U/L (ref 45–117)
ALT SERPL-CCNC: 62 U/L (ref 12–78)
ANION GAP SERPL CALC-SCNC: 5 MMOL/L (ref 5–15)
AST SERPL-CCNC: 39 U/L (ref 15–37)
BILIRUB SERPL-MCNC: 0.4 MG/DL (ref 0.2–1)
BUN SERPL-MCNC: 12 MG/DL (ref 6–20)
BUN/CREAT SERPL: 10 (ref 12–20)
CALCIUM SERPL-MCNC: 8.8 MG/DL (ref 8.5–10.1)
CHLORIDE SERPL-SCNC: 109 MMOL/L (ref 97–108)
CO2 SERPL-SCNC: 25 MMOL/L (ref 21–32)
CREAT SERPL-MCNC: 1.2 MG/DL (ref 0.55–1.02)
ERYTHROCYTE [DISTWIDTH] IN BLOOD BY AUTOMATED COUNT: 14.6 % (ref 11.5–14.5)
GLOBULIN SER CALC-MCNC: 4.2 G/DL (ref 2–4)
GLUCOSE BLD STRIP.AUTO-MCNC: 148 MG/DL (ref 65–117)
GLUCOSE BLD STRIP.AUTO-MCNC: 169 MG/DL (ref 65–117)
GLUCOSE BLD STRIP.AUTO-MCNC: 172 MG/DL (ref 65–117)
GLUCOSE BLD STRIP.AUTO-MCNC: 181 MG/DL (ref 65–117)
GLUCOSE SERPL-MCNC: 168 MG/DL (ref 65–100)
HCT VFR BLD AUTO: 26.6 % (ref 35–47)
HGB BLD-MCNC: 8.9 G/DL (ref 11.5–16)
MCH RBC QN AUTO: 27.2 PG (ref 26–34)
MCHC RBC AUTO-ENTMCNC: 33.5 G/DL (ref 30–36.5)
MCV RBC AUTO: 81.3 FL (ref 80–99)
NRBC # BLD: 0.04 K/UL (ref 0–0.01)
NRBC BLD-RTO: 0.2 PER 100 WBC
PLATELET # BLD AUTO: 394 K/UL (ref 150–400)
PMV BLD AUTO: 10.3 FL (ref 8.9–12.9)
POTASSIUM SERPL-SCNC: 3.3 MMOL/L (ref 3.5–5.1)
PROT SERPL-MCNC: 6.5 G/DL (ref 6.4–8.2)
RBC # BLD AUTO: 3.27 M/UL (ref 3.8–5.2)
SERVICE CMNT-IMP: ABNORMAL
SODIUM SERPL-SCNC: 139 MMOL/L (ref 136–145)
WBC # BLD AUTO: 20.9 K/UL (ref 3.6–11)

## 2021-07-06 PROCEDURE — 74011250637 HC RX REV CODE- 250/637: Performed by: STUDENT IN AN ORGANIZED HEALTH CARE EDUCATION/TRAINING PROGRAM

## 2021-07-06 PROCEDURE — 74011250636 HC RX REV CODE- 250/636: Performed by: INTERNAL MEDICINE

## 2021-07-06 PROCEDURE — 65270000029 HC RM PRIVATE

## 2021-07-06 PROCEDURE — 74011250636 HC RX REV CODE- 250/636: Performed by: STUDENT IN AN ORGANIZED HEALTH CARE EDUCATION/TRAINING PROGRAM

## 2021-07-06 PROCEDURE — 80053 COMPREHEN METABOLIC PANEL: CPT

## 2021-07-06 PROCEDURE — 36415 COLL VENOUS BLD VENIPUNCTURE: CPT

## 2021-07-06 PROCEDURE — 74011000258 HC RX REV CODE- 258: Performed by: INTERNAL MEDICINE

## 2021-07-06 PROCEDURE — 74011636637 HC RX REV CODE- 636/637: Performed by: HOSPITALIST

## 2021-07-06 PROCEDURE — 74011250637 HC RX REV CODE- 250/637: Performed by: HOSPITALIST

## 2021-07-06 PROCEDURE — 85027 COMPLETE CBC AUTOMATED: CPT

## 2021-07-06 PROCEDURE — 82962 GLUCOSE BLOOD TEST: CPT

## 2021-07-06 PROCEDURE — 97116 GAIT TRAINING THERAPY: CPT

## 2021-07-06 PROCEDURE — 97530 THERAPEUTIC ACTIVITIES: CPT

## 2021-07-06 RX ORDER — POTASSIUM CHLORIDE 750 MG/1
40 TABLET, FILM COATED, EXTENDED RELEASE ORAL EVERY 4 HOURS
Status: COMPLETED | OUTPATIENT
Start: 2021-07-06 | End: 2021-07-06

## 2021-07-06 RX ADMIN — Medication 1 CAPSULE: at 08:17

## 2021-07-06 RX ADMIN — INSULIN LISPRO 2 UNITS: 100 INJECTION, SOLUTION INTRAVENOUS; SUBCUTANEOUS at 07:08

## 2021-07-06 RX ADMIN — VERAPAMIL HYDROCHLORIDE 240 MG: 120 TABLET, FILM COATED, EXTENDED RELEASE ORAL at 08:17

## 2021-07-06 RX ADMIN — RISPERIDONE 3 MG: 1 TABLET ORAL at 08:17

## 2021-07-06 RX ADMIN — HEPARIN SODIUM 5000 UNITS: 5000 INJECTION INTRAVENOUS; SUBCUTANEOUS at 02:26

## 2021-07-06 RX ADMIN — ACETAMINOPHEN 650 MG: 325 TABLET ORAL at 21:52

## 2021-07-06 RX ADMIN — BENZTROPINE MESYLATE 0.5 MG: 1 TABLET ORAL at 17:00

## 2021-07-06 RX ADMIN — Medication 10 ML: at 13:31

## 2021-07-06 RX ADMIN — HEPARIN SODIUM 5000 UNITS: 5000 INJECTION INTRAVENOUS; SUBCUTANEOUS at 11:36

## 2021-07-06 RX ADMIN — Medication 10 ML: at 22:26

## 2021-07-06 RX ADMIN — PIPERACILLIN AND TAZOBACTAM 3.38 G: 3; .375 INJECTION, POWDER, LYOPHILIZED, FOR SOLUTION INTRAVENOUS at 13:32

## 2021-07-06 RX ADMIN — INSULIN GLARGINE 15 UNITS: 100 INJECTION, SOLUTION SUBCUTANEOUS at 22:27

## 2021-07-06 RX ADMIN — INSULIN LISPRO 2 UNITS: 100 INJECTION, SOLUTION INTRAVENOUS; SUBCUTANEOUS at 17:00

## 2021-07-06 RX ADMIN — PANTOPRAZOLE SODIUM 40 MG: 40 TABLET, DELAYED RELEASE ORAL at 07:03

## 2021-07-06 RX ADMIN — PIPERACILLIN AND TAZOBACTAM 3.38 G: 3; .375 INJECTION, POWDER, LYOPHILIZED, FOR SOLUTION INTRAVENOUS at 22:28

## 2021-07-06 RX ADMIN — POTASSIUM CHLORIDE 40 MEQ: 750 TABLET, FILM COATED, EXTENDED RELEASE ORAL at 11:36

## 2021-07-06 RX ADMIN — ATORVASTATIN CALCIUM 10 MG: 10 TABLET, FILM COATED ORAL at 22:26

## 2021-07-06 RX ADMIN — POTASSIUM CHLORIDE 40 MEQ: 750 TABLET, FILM COATED, EXTENDED RELEASE ORAL at 07:31

## 2021-07-06 RX ADMIN — INSULIN LISPRO 2 UNITS: 100 INJECTION, SOLUTION INTRAVENOUS; SUBCUTANEOUS at 11:36

## 2021-07-06 RX ADMIN — BENZTROPINE MESYLATE 0.5 MG: 1 TABLET ORAL at 08:17

## 2021-07-06 RX ADMIN — PIPERACILLIN AND TAZOBACTAM 3.38 G: 3; .375 INJECTION, POWDER, LYOPHILIZED, FOR SOLUTION INTRAVENOUS at 07:03

## 2021-07-06 RX ADMIN — HEPARIN SODIUM 5000 UNITS: 5000 INJECTION INTRAVENOUS; SUBCUTANEOUS at 18:32

## 2021-07-06 RX ADMIN — Medication 10 ML: at 07:04

## 2021-07-06 RX ADMIN — ACETAMINOPHEN 650 MG: 325 TABLET ORAL at 03:05

## 2021-07-06 NOTE — PROGRESS NOTES
FREDY:  RUR: 14%    Disposition:  Home    Chart reviewed. Patient admitted here on 6/30/21 from home with complaints of disorientation (per her son -Pablito Jurado) last 2-3 days. The patient was admitted with dx of severe sepsis and acute cystitis. The patient has a past medical hx of  NIDDM, Dyslipidemia, Parkinson's, Mood Disorder, PUD. Reason for Admission:  Severe sepsis and acute cystitis                     RUR Score:   14%               PCP: First and Last name:   Carson Salgado MD     Name of Practice: [address] [place] [address]   Are you a current patient: Yes/No: yes   Approximate date of last visit: 2 months agao   Can you participate in a virtual visit if needed: yes    Do you (patient/family) have any concerns for transition/discharge? No               Plan for utilizing home health:   Not indicated at this time    Current Advanced Directive/Advance Care Plan:  Full Code      Healthcare Decision Maker:   Click here to complete [employer] including selection of the Healthcare Decision Maker Relationship (ie \"Primary\")              Transition of Care Plan:      Home once medically stable    The patient is  (spouse Thomas Santana passed 4 years ago). The patient lives alone in a 2 story residence with her bedroom on the 2nd floor. She uses no DME. 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 She has a supportive son Pablito Jurado) who checks on her frequently and will be available to assist with d/c transportation. The patient uses [employer]. Patient had questions re Medicare IM letter given to her on 7/4/21 by patient access Team member. CM explained the content of the document to the patient and provided an opportunity to ask questions. CM will continue to follow. Jaime Stephenson, [address], CRM  [phone]     CM will continue to follow.

## 2021-07-06 NOTE — PROGRESS NOTES
Problem: Falls - Risk of  Goal: *Absence of Falls  Description: Document Sherri Oneil Fall Risk and appropriate interventions in the flowsheet.   Outcome: Progressing Towards Goal  Note: Fall Risk Interventions:  Mobility Interventions: Patient to call before getting OOB    Mentation Interventions: Adequate sleep, hydration, pain control    Medication Interventions: Evaluate medications/consider consulting pharmacy    Elimination Interventions: Call light in reach, Patient to call for help with toileting needs

## 2021-07-06 NOTE — PROGRESS NOTES
Problem: Mobility Impaired (Adult and Pediatric)  Goal: *Acute Goals and Plan of Care (Insert Text)  Description: FUNCTIONAL STATUS PRIOR TO ADMISSION: Patient deemed a poor historian at the time of eval. She reported that she was independent and active without use of DME.    HOME SUPPORT PRIOR TO ADMISSION: The patient lived alone and has a son locally. It is unclear how much support he is able to provide upon discharge. [facility] Physical Therapy Goals  Initiated 7/1/2021  1. Patient will move from supine to sit and sit to supine , scoot up and down, and roll side to side in bed with independence within 7 day(s). 2.  Patient will transfer from bed to chair and chair to bed with independence using the least restrictive device within 7 day(s). 3.  Patient will perform sit to stand with independence within 7 day(s). 4.  Patient will ambulate with independence for 300 feet with the least restrictive device within 7 day(s). 5.  Patient will ascend/descend 12 stairs with one handrail(s) with modified independence within 7 day(s). Outcome: Progressing Towards Goal   PHYSICAL THERAPY TREATMENT  Patient: Jessica Toro (34 y.o. female)  Date: 7/6/2021  Diagnosis: Sepsis ([facility] [A41.9] <principal problem not specified>       Precautions: Fall, Bed Alarm  Chart, physical therapy assessment, plan of care and goals were reviewed. ASSESSMENT  Patient continues with skilled PT services and is progressing towards goals. Patient moving at overall supervision level with upright mobility. Gait is slightly altered with increase trunk lateral sway. She indicates minimal activity over the weekend other than back and forth to the bathroom. Feel she will likely progress to more safe mobility if allowed up and walking more in room and in the dorsey with staff. Do not feel she is safe to and from the bathroom with the IV pole due to \"sticking\" wheels. Nursing to see if can change pole.   Son present and assures that he will be available to assist patient as needed. .     Current Level of Function Impacting Discharge (mobility/balance): supervision    Other factors to consider for discharge: lives alone         PLAN :  Patient continues to benefit from skilled intervention to address the above impairments. Continue treatment per established plan of care. to address goals. Recommendation for discharge: (in order for the patient to meet his/her long term goals)  No skilled physical therapy/ follow up rehabilitation needs identified at this time. This discharge recommendation:  Has been made in collaboration with the attending provider and/or case management    IF patient discharges home will need the following DME: none       SUBJECTIVE:   Patient stated I take myself to the bathroom.     OBJECTIVE DATA SUMMARY:   Critical Behavior:  Neurologic State: (P) Alert, Appropriate for age  Orientation Level: Oriented to person, Oriented to place, Disoriented to time, Disoriented to situation  Cognition: Decreased command following  Safety/Judgement: Decreased insight into deficits, Decreased awareness of need for safety, Decreased awareness of need for assistance  Functional Mobility Training:  Bed Mobility:     Supine to Sit: Modified independent              Transfers:  Sit to Stand: Supervision  Stand to Sit: Supervision                             Balance:  Sitting: Intact  Standing: Impaired; Without support  Standing - Static: Good  Standing - Dynamic : Fair  Ambulation/Gait Training:  Distance (ft): 200 Feet (ft)  Assistive Device: Gait belt  Ambulation - Level of Assistance: Stand-by assistance        Gait Abnormalities: Decreased step clearance;Trunk sway increased        Base of Support: Widened        Step Length: Right shortened;Left shortened         Activity Tolerance:   Good    After treatment patient left in no apparent distress:   Sitting in chair, Call bell within reach, and Caregiver / family present    COMMUNICATION/COLLABORATION:   The patients plan of care was discussed with: Registered nurse and Certified nursing assistant/patient care technician.      Ivon Lozano, PT   Time Calculation: 30 mins

## 2021-07-07 LAB
ANION GAP SERPL CALC-SCNC: 4 MMOL/L (ref 5–15)
BACTERIA SPEC CULT: NORMAL
BUN SERPL-MCNC: 9 MG/DL (ref 6–20)
BUN/CREAT SERPL: 8 (ref 12–20)
CALCIUM SERPL-MCNC: 9.5 MG/DL (ref 8.5–10.1)
CHLORIDE SERPL-SCNC: 110 MMOL/L (ref 97–108)
CO2 SERPL-SCNC: 26 MMOL/L (ref 21–32)
CREAT SERPL-MCNC: 1.14 MG/DL (ref 0.55–1.02)
ERYTHROCYTE [DISTWIDTH] IN BLOOD BY AUTOMATED COUNT: 14.6 % (ref 11.5–14.5)
GLUCOSE BLD STRIP.AUTO-MCNC: 138 MG/DL (ref 65–117)
GLUCOSE BLD STRIP.AUTO-MCNC: 138 MG/DL (ref 65–117)
GLUCOSE BLD STRIP.AUTO-MCNC: 155 MG/DL (ref 65–117)
GLUCOSE BLD STRIP.AUTO-MCNC: 162 MG/DL (ref 65–117)
GLUCOSE SERPL-MCNC: 130 MG/DL (ref 65–100)
HCT VFR BLD AUTO: 29.3 % (ref 35–47)
HGB BLD-MCNC: 9.7 G/DL (ref 11.5–16)
MCH RBC QN AUTO: 27.2 PG (ref 26–34)
MCHC RBC AUTO-ENTMCNC: 33.1 G/DL (ref 30–36.5)
MCV RBC AUTO: 82.1 FL (ref 80–99)
NRBC # BLD: 0.03 K/UL (ref 0–0.01)
NRBC BLD-RTO: 0.1 PER 100 WBC
PLATELET # BLD AUTO: 497 K/UL (ref 150–400)
PMV BLD AUTO: 9.8 FL (ref 8.9–12.9)
POTASSIUM SERPL-SCNC: 4 MMOL/L (ref 3.5–5.1)
RBC # BLD AUTO: 3.57 M/UL (ref 3.8–5.2)
SERVICE CMNT-IMP: ABNORMAL
SERVICE CMNT-IMP: NORMAL
SODIUM SERPL-SCNC: 140 MMOL/L (ref 136–145)
WBC # BLD AUTO: 22 K/UL (ref 3.6–11)

## 2021-07-07 PROCEDURE — 74011250637 HC RX REV CODE- 250/637: Performed by: STUDENT IN AN ORGANIZED HEALTH CARE EDUCATION/TRAINING PROGRAM

## 2021-07-07 PROCEDURE — 74011636637 HC RX REV CODE- 636/637: Performed by: HOSPITALIST

## 2021-07-07 PROCEDURE — 36415 COLL VENOUS BLD VENIPUNCTURE: CPT

## 2021-07-07 PROCEDURE — 74011250636 HC RX REV CODE- 250/636: Performed by: HOSPITALIST

## 2021-07-07 PROCEDURE — 74011250636 HC RX REV CODE- 250/636: Performed by: INTERNAL MEDICINE

## 2021-07-07 PROCEDURE — 74011000258 HC RX REV CODE- 258: Performed by: INTERNAL MEDICINE

## 2021-07-07 PROCEDURE — 65270000029 HC RM PRIVATE

## 2021-07-07 PROCEDURE — 80048 BASIC METABOLIC PNL TOTAL CA: CPT

## 2021-07-07 PROCEDURE — 85027 COMPLETE CBC AUTOMATED: CPT

## 2021-07-07 PROCEDURE — 74011250637 HC RX REV CODE- 250/637: Performed by: HOSPITALIST

## 2021-07-07 PROCEDURE — 82962 GLUCOSE BLOOD TEST: CPT

## 2021-07-07 PROCEDURE — 74011250636 HC RX REV CODE- 250/636: Performed by: STUDENT IN AN ORGANIZED HEALTH CARE EDUCATION/TRAINING PROGRAM

## 2021-07-07 RX ADMIN — HEPARIN SODIUM 5000 UNITS: 5000 INJECTION INTRAVENOUS; SUBCUTANEOUS at 18:27

## 2021-07-07 RX ADMIN — HEPARIN SODIUM 5000 UNITS: 5000 INJECTION INTRAVENOUS; SUBCUTANEOUS at 04:02

## 2021-07-07 RX ADMIN — PIPERACILLIN AND TAZOBACTAM 3.38 G: 3; .375 INJECTION, POWDER, LYOPHILIZED, FOR SOLUTION INTRAVENOUS at 13:36

## 2021-07-07 RX ADMIN — SODIUM CHLORIDE 50 ML/HR: 9 INJECTION, SOLUTION INTRAVENOUS at 04:02

## 2021-07-07 RX ADMIN — ATORVASTATIN CALCIUM 10 MG: 10 TABLET, FILM COATED ORAL at 21:28

## 2021-07-07 RX ADMIN — BENZTROPINE MESYLATE 0.5 MG: 1 TABLET ORAL at 17:31

## 2021-07-07 RX ADMIN — RISPERIDONE 3 MG: 1 TABLET ORAL at 08:20

## 2021-07-07 RX ADMIN — PIPERACILLIN AND TAZOBACTAM 3.38 G: 3; .375 INJECTION, POWDER, LYOPHILIZED, FOR SOLUTION INTRAVENOUS at 21:34

## 2021-07-07 RX ADMIN — INSULIN LISPRO 2 UNITS: 100 INJECTION, SOLUTION INTRAVENOUS; SUBCUTANEOUS at 11:48

## 2021-07-07 RX ADMIN — BENZTROPINE MESYLATE 0.5 MG: 1 TABLET ORAL at 08:20

## 2021-07-07 RX ADMIN — PANTOPRAZOLE SODIUM 40 MG: 40 TABLET, DELAYED RELEASE ORAL at 06:42

## 2021-07-07 RX ADMIN — Medication 10 ML: at 21:33

## 2021-07-07 RX ADMIN — Medication 10 ML: at 13:36

## 2021-07-07 RX ADMIN — Medication 10 ML: at 06:05

## 2021-07-07 RX ADMIN — VERAPAMIL HYDROCHLORIDE 240 MG: 120 TABLET, FILM COATED, EXTENDED RELEASE ORAL at 07:07

## 2021-07-07 RX ADMIN — HEPARIN SODIUM 5000 UNITS: 5000 INJECTION INTRAVENOUS; SUBCUTANEOUS at 11:45

## 2021-07-07 RX ADMIN — PIPERACILLIN AND TAZOBACTAM 3.38 G: 3; .375 INJECTION, POWDER, LYOPHILIZED, FOR SOLUTION INTRAVENOUS at 06:05

## 2021-07-07 RX ADMIN — INSULIN GLARGINE 15 UNITS: 100 INJECTION, SOLUTION SUBCUTANEOUS at 22:00

## 2021-07-07 RX ADMIN — Medication 1 CAPSULE: at 08:20

## 2021-07-07 RX ADMIN — ACETAMINOPHEN 650 MG: 325 TABLET ORAL at 21:28

## 2021-07-07 NOTE — PROGRESS NOTES
Physical Therapy  Attempted to work with patient but she refuses to work with therapy. Attempted to educate on benefit and purpose but she continued to refuse, stating \"not today, no.\"  She is up in the chair. Will follow up later if agreeable or follow up tomorrow. Recommend staff to walk with patient in dorsey as tolerated.   Obdulia Diane, PT

## 2021-07-07 NOTE — PROGRESS NOTES
[address] [facility] Adult  Hospitalist Group                                                                                          Hospitalist Progress Note  Daniel Hernandez MD  Answering service: [phone] from in house phone        Date of Service:  2021  NAME:  Debbie Nagel  :  1955  MRN:  [id number]      Admission Summary:     Debbie Nagel is a 74 y.o. female with hx of NIDDM II, Dyslipidemia, Parkinson's, Mood disorder, PUD who presented to ed for confusion and poor po intake. Patient is seen and examined at bedside. She is alert and oriented to person and place but still mildly confused and provides limited history. States she has had persistent nausea over the last few days as well as lower abdominal pain. Per chart review, son reported that patient over the last 3 days has become increasingly confused. The patient denies any fever, chills, chest pain, cough, congestion, recent illness, palpitations, or dysuria. Interval history / Subjective:     She said she feels better, no cough, left side chest pain, shortness of breath, urinary complaint or lower abdominal pain.  Her last bowel movement was last night     Assessment & Plan:     Severe sepsis secondary to acute cystitis POA  -iv rocephin switched to IV zosyn on  and tutu Q, IVF discontinued  -fever improving, last fever was 101 on , temp 101.8 on ,   -had tachycardic, tachypenic, RR 33, leukocytosis, AMANDEEP, lactic acid 2.4 now improving to 1.6  -leukocytosis trending up 22    -urine cx E Coli  -blood cx grow on  e coli, sensitive to ceftriaxone   -repeated blood cx  no growth so far  -CT abdomen pelvis on  small bilateral pleural effusions, mild pelvic fluid   -ID on board    T2DM  -A1c 6.8  -continue lantus 15 units, sliding scale and monitor finger stick glucose   -home glimepiride on hold    AMANDEEP  -creatinine improving with IVF, Cr 1.14 from 2.81, discontinue IVF  -avoid nephrotoxin -monitor renal function     Anemia likely due to chronic disease   -stable, Hgb 9.7  -monitor H/H    Hypokalemia  -replaced and resolved    HTN  -BP not at goal, continue verapamil , monitor BP    Mood disorder   -stable, continue on cogentin and risperidone    Dyslipidemia   -stable, continue lipitor    Hx of PUD  -stable, continue protonix    Acute metabolic encephalopathy  -improved  -conscious and well oriented    Hx of Parkinson's disease   -not on medication  -PT/OT    Code status: Full Code  DVT prophylaxis: heparin    Care Plan discussed with: Patient/Family, Nurse and   Anticipated Disposition: Home with [facility], PT/OT  Anticipated Discharge: Greater than 48 hours   Discussed clinical finding, care plan with the patient and her son, Anibal El  at the bedside, questions answered      Hospital Problems  Date Reviewed: 10/5/2016        Codes Class Noted POA    Sepsis ([facility] ICD-10-CM: A41.9  ICD-9-CM: 038.9, 995.91  6/30/2021 Unknown                 Vital Signs:    Last 24hrs VS reviewed since prior progress note. Most recent are:  Visit Vitals  BP (!) 141/93 (BP 1 Location: Right lower arm, BP Patient Position: At rest)   Pulse 95   Temp 99.8 °F (37.7 °C)   Resp 16   Ht 5' 3\" (1.6 m)   Wt 98.3 kg (216 lb 11.2 oz)   SpO2 97%   BMI 38.39 kg/m²         Intake/Output Summary (Last 24 hours) at 7/7/2021 1231  Last data filed at 7/7/2021 0825  Gross per 24 hour   Intake 820 ml   Output    Net 820 ml        Physical Examination:     I had a face to face encounter with this patient and independently examined them on 7/7/2021 as outlined below:          Constitutional:  No acute distress, cooperative, pleasant    ENT:  Oral mucosa moist, oropharynx benign. Resp:  CTA bilaterally. No wheezing/rhonchi/rales. No accessory muscle use   CV:  Regular rhythm, normal rate, no murmurs, gallops, rubs    GI:  Soft, non distended, non tender.  normoactive bowel sounds, no hepatosplenomegaly Musculoskeletal:  No edema,     Neurologic:  Conscious and alert, well oriented, moves all extremities, CN II-XII grossly intact            Data Review:    Review and/or order of clinical lab test  Review and/or order of tests in the radiology section of CPT  Review and/or order of tests in the medicine section of CPT      Labs:     Recent Labs     07/07/21  0808 07/06/21  0309   WBC 22.0* 20.9*   HGB 9.7* 8.9*   HCT 29.3* 26.6*   * 394     Recent Labs     07/07/21  0808 07/06/21  0309 07/05/21  0634    139 143   K 4.0 3.3* 3.2*   * 109* 110*   CO2 26 25 24   BUN 9 12 15   CREA 1.14* 1.20* 1.18*   * 168* 117*   CA 9.5 8.8 8.6     Recent Labs     07/06/21  0309   ALT 62   *   TBILI 0.4   TP 6.5   ALB 2.3*   GLOB 4.2*     No results for input(s): INR, PTP, APTT, INREXT, INREXT in the last 72 hours. No results for input(s): FE, TIBC, PSAT, FERR in the last 72 hours. No results found for: FOL, RBCF   No results for input(s): PH, PCO2, PO2 in the last 72 hours. No results for input(s): CPK, CKNDX, TROIQ in the last 72 hours.     No lab exists for component: CPKMB  No results found for: CHOL, CHOLX, CHLST, CHOLV, HDL, HDLP, LDL, LDLC, DLDLP, TGLX, TRIGL, TRIGP, CHHD, CHHDX  Lab Results   Component Value Date/Time    Glucose (POC) 155 (H) 07/07/2021 10:57 AM    Glucose (POC) 138 (H) 07/07/2021 06:14 AM    Glucose (POC) 169 (H) 07/06/2021 08:50 PM    Glucose (POC) 148 (H) 07/06/2021 03:58 PM    Glucose (POC) 181 (H) 07/06/2021 11:07 AM     Lab Results   Component Value Date/Time    Color YELLOW/STRAW 06/30/2021 06:58 PM    Appearance CLOUDY (A) 06/30/2021 06:58 PM    Specific gravity 1.025 06/30/2021 06:58 PM    pH (UA) 5.5 06/30/2021 06:58 PM    Protein 100 (A) 06/30/2021 06:58 PM    Glucose Negative 06/30/2021 06:58 PM    Ketone TRACE (A) 06/30/2021 06:58 PM    Urobilinogen 1.0 06/30/2021 06:58 PM    Nitrites Negative 06/30/2021 06:58 PM    Leukocyte Esterase MODERATE (A) 06/30/2021 06:58 PM    Epithelial cells FEW 06/30/2021 06:58 PM    Bacteria 3+ (A) 06/30/2021 06:58 PM    WBC  06/30/2021 06:58 PM    RBC 0-5 06/30/2021 06:58 PM         Medications Reviewed:     Current Facility-Administered Medications   Medication Dose Route Frequency    piperacillin-tazobactam (ZOSYN) 3.375 g in 0.9% sodium chloride (MBP/ADV) 100 mL MBP  3.375 g IntraVENous Q8H    benztropine (COGENTIN) tablet 0.5 mg  0.5 mg Oral BID    risperiDONE (RisperDAL) tablet 3 mg  3 mg Oral DAILY    pantoprazole (PROTONIX) tablet 40 mg  40 mg Oral ACB    verapamil ER (CALAN-SR) tablet 240 mg  240 mg Oral DAILY WITH BREAKFAST    atorvastatin (LIPITOR) tablet 10 mg  10 mg Oral QHS    sodium chloride (NS) flush 5-40 mL  5-40 mL IntraVENous Q8H    sodium chloride (NS) flush 5-40 mL  5-40 mL IntraVENous PRN    acetaminophen (TYLENOL) tablet 650 mg  650 mg Oral Q6H PRN    Or    acetaminophen (TYLENOL) suppository 650 mg  650 mg Rectal Q6H PRN    polyethylene glycol (MIRALAX) packet 17 g  17 g Oral DAILY PRN    ondansetron (ZOFRAN ODT) tablet 4 mg  4 mg Oral Q8H PRN    Or    ondansetron (ZOFRAN) injection 4 mg  4 mg IntraVENous Q6H PRN    0.9% sodium chloride infusion  50 mL/hr IntraVENous CONTINUOUS    heparin (porcine) injection 5,000 Units  5,000 Units SubCUTAneous Q8H    insulin glargine (LANTUS) injection 15 Units  15 Units SubCUTAneous QHS    insulin lispro (HUMALOG) injection   SubCUTAneous AC&HS    glucose chewable tablet 16 g  4 Tablet Oral PRN    dextrose (D50W) injection syrg 12.5-25 g  12.5-25 g IntraVENous PRN    glucagon (GLUCAGEN) injection 1 mg  1 mg IntraMUSCular PRN    L.acidophilus-paracasei-S.thermophil-bifidobacter (RISAQUAD) 8 billion cell capsule  1 Capsule Oral DAILY     ______________________________________________________________________  EXPECTED LENGTH OF STAY: 4d 19h  ACTUAL LENGTH OF STAY:          7                 Shorty King MD

## 2021-07-07 NOTE — PROGRESS NOTES
0000 Report received from [facility]. Patient resting in bed, no needs expressed. Call bell within reach,    0300 Patient refused lab draw this AM stating, \"i'm sick of people sticking me everywhere everyday\" I explained to the patient it's so that the doctors can see the results in the morning so they can treat you better. But patient just shook her hear and looked the other side. Problem: Falls - Risk of  Goal: *Absence of Falls  Description: Document Star  Fall Risk and appropriate interventions in the flowsheet. Outcome: Progressing Towards Goal  Note: Fall Risk Interventions:  Mobility Interventions: Communicate number of staff needed for ambulation/transfer, Patient to call before getting OOB    Mentation Interventions: Adequate sleep, hydration, pain control, More frequent rounding, Toileting rounds    Medication Interventions: Teach patient to arise slowly, Patient to call before getting OOB    Elimination Interventions: Call light in reach, Patient to call for help with toileting needs         Problem: Diabetes Self-Management  Goal: *Disease process and treatment process  Description: Define diabetes and identify own type of diabetes; list 3 options for treating diabetes. Outcome: Progressing Towards Goal       0735 Bedside shift change report given to Bart Santiago, RN by Katelynn Garcia, ANAI. Report included the following information SBAR, Kardex, MAR, Accordion, and Recent Results.

## 2021-07-07 NOTE — PROGRESS NOTES
Occupational Therapy: Chart reviewed. Cleared by nurse. Patient received seated in chair. Patient is alert, oriented. States she does not use equipment of any kind at home. States son assists her with any needs. Declines participation in any activity including mobility, exercise, or self care by just stating \"No\" to all. Verbalizes discomfort of bilateral UEs from needle sticks. States she feels she is back to baseline when asked. Session aborted. Noted recommendation of home with assist of son at discharge. No OT needs anticipated at discharge at this time. Will follow for one more session to encourage participation and determine if any needs.    TRACY Mejia/RAUL

## 2021-07-07 NOTE — PROGRESS NOTES
ID Progress Note  2021    Subjective: Only minimally elevated temp. WBC still up. No dyspnea, abdominal pain, headache or sore throat. No diarrhea. ROS: No anaphylaxis, seizures, syncope, hematemesis, hematochezia     Objective:     Vitals:   Visit Vitals  BP (!) 167/99 (BP 1 Location: Right lower arm, BP Patient Position: At rest)   Pulse 94   Temp 98.3 °F (36.8 °C)   Resp 18   Ht 5' 3\" (1.6 m)   Wt 98.3 kg (216 lb 11.2 oz)   SpO2 97%   BMI 38.39 kg/m²        Tmax:  Temp (24hrs), Av.9 °F (37.2 °C), Min:98 °F (36.7 °C), Max:99.8 °F (37.7 °C)      Exam:    Not in distress  Pink conjunctivae, anicteric sclerae  No cervical lymphdenopathy   Lung clear, no rales, wheezes or rhonchi   Heart: s1, s2, RRR, no murmurs rubs or clicks  Abdomen: soft nontender, no guarding or rebound  Knees not warm or tender  Speech fluent     Labs:   Lab Results   Component Value Date/Time    WBC 22.0 (H) 2021 08:08 AM    HGB 9.7 (L) 2021 08:08 AM    HCT 29.3 (L) 2021 08:08 AM    PLATELET 466 (H)  08:08 AM    MCV 82.1 2021 08:08 AM     Lab Results   Component Value Date/Time    Sodium 140 2021 08:08 AM    Potassium 4.0 2021 08:08 AM    Chloride 110 (H) 2021 08:08 AM    CO2 26 2021 08:08 AM    Anion gap 4 (L) 2021 08:08 AM    Glucose 130 (H) 2021 08:08 AM    BUN 9 2021 08:08 AM    Creatinine 1.14 (H) 2021 08:08 AM    BUN/Creatinine ratio 8 (L) 2021 08:08 AM    GFR est AA 57 (L) 2021 08:08 AM    GFR est non-AA 47 (L) 2021 08:08 AM    Calcium 9.5 2021 08:08 AM    Bilirubin, total 0.4 2021 03:09 AM    Alk.  phosphatase 121 (H) 2021 03:09 AM    Protein, total 6.5 2021 03:09 AM    Albumin 2.3 (L) 2021 03:09 AM    Globulin 4.2 (H) 2021 03:09 AM    A-G Ratio 0.5 (L) 2021 03:09 AM    ALT (SGPT) 62 2021 03:09 AM           Assessment:     #1 E. coli bacteremia     #2 E. coli UTI     #3 leukocytosis     #4 diabetes     #5 history of breast cancer     #6 acute renal failurebetter     #7 parkinsonism  Recommendations:     Does not look septic but wbc elevated. Get wbc scan tomorrow. Will speak with pcp to see if her previous wbcs have already been elevated.      Garth Ragland MD

## 2021-07-07 NOTE — PROGRESS NOTES
Problem: Falls - Risk of  Goal: *Absence of Falls  Description: Document Charisse Saldañaayanna Fall Risk and appropriate interventions in the flowsheet.   Outcome: Progressing Towards Goal  Note: Fall Risk Interventions:  Mobility Interventions: Communicate number of staff needed for ambulation/transfer, Patient to call before getting OOB    Mentation Interventions: Adequate sleep, hydration, pain control    Medication Interventions: Evaluate medications/consider consulting pharmacy, Patient to call before getting OOB    Elimination Interventions: Call light in reach

## 2021-07-08 ENCOUNTER — APPOINTMENT (OUTPATIENT)
Dept: NUCLEAR MEDICINE | Age: 66
DRG: 871 | End: 2021-07-08
Attending: INTERNAL MEDICINE
Payer: MEDICARE

## 2021-07-08 LAB
ALBUMIN SERPL-MCNC: 2.6 G/DL (ref 3.5–5)
ALBUMIN/GLOB SERPL: 0.7 {RATIO} (ref 1.1–2.2)
ALP SERPL-CCNC: 103 U/L (ref 45–117)
ALT SERPL-CCNC: 91 U/L (ref 12–78)
ANION GAP SERPL CALC-SCNC: 7 MMOL/L (ref 5–15)
AST SERPL-CCNC: 55 U/L (ref 15–37)
BASOPHILS # BLD: 0 K/UL (ref 0–0.1)
BASOPHILS NFR BLD: 0 % (ref 0–1)
BILIRUB SERPL-MCNC: 0.4 MG/DL (ref 0.2–1)
BUN SERPL-MCNC: 11 MG/DL (ref 6–20)
BUN/CREAT SERPL: 10 (ref 12–20)
CALCIUM SERPL-MCNC: 9 MG/DL (ref 8.5–10.1)
CHLORIDE SERPL-SCNC: 109 MMOL/L (ref 97–108)
CO2 SERPL-SCNC: 25 MMOL/L (ref 21–32)
CREAT SERPL-MCNC: 1.12 MG/DL (ref 0.55–1.02)
DIFFERENTIAL METHOD BLD: ABNORMAL
EOSINOPHIL # BLD: 0 K/UL (ref 0–0.4)
EOSINOPHIL NFR BLD: 0 % (ref 0–7)
ERYTHROCYTE [DISTWIDTH] IN BLOOD BY AUTOMATED COUNT: 14.9 % (ref 11.5–14.5)
GLOBULIN SER CALC-MCNC: 3.5 G/DL (ref 2–4)
GLUCOSE BLD STRIP.AUTO-MCNC: 123 MG/DL (ref 65–117)
GLUCOSE BLD STRIP.AUTO-MCNC: 142 MG/DL (ref 65–117)
GLUCOSE BLD STRIP.AUTO-MCNC: 194 MG/DL (ref 65–117)
GLUCOSE BLD STRIP.AUTO-MCNC: 202 MG/DL (ref 65–117)
GLUCOSE SERPL-MCNC: 119 MG/DL (ref 65–100)
HCT VFR BLD AUTO: 26.5 % (ref 35–47)
HGB BLD-MCNC: 8.7 G/DL (ref 11.5–16)
IMM GRANULOCYTES # BLD AUTO: 0 K/UL
IMM GRANULOCYTES NFR BLD AUTO: 0 %
LYMPHOCYTES # BLD: 2.8 K/UL (ref 0.8–3.5)
LYMPHOCYTES NFR BLD: 13 % (ref 12–49)
MCH RBC QN AUTO: 27.1 PG (ref 26–34)
MCHC RBC AUTO-ENTMCNC: 32.8 G/DL (ref 30–36.5)
MCV RBC AUTO: 82.6 FL (ref 80–99)
MONOCYTES # BLD: 0.2 K/UL (ref 0–1)
MONOCYTES NFR BLD: 1 % (ref 5–13)
NEUTS SEG # BLD: 18.6 K/UL (ref 1.8–8)
NEUTS SEG NFR BLD: 86 % (ref 32–75)
NRBC # BLD: 0.02 K/UL (ref 0–0.01)
NRBC BLD-RTO: 0.1 PER 100 WBC
PLATELET # BLD AUTO: 521 K/UL (ref 150–400)
PMV BLD AUTO: 9.7 FL (ref 8.9–12.9)
POTASSIUM SERPL-SCNC: 3.9 MMOL/L (ref 3.5–5.1)
PROT SERPL-MCNC: 6.1 G/DL (ref 6.4–8.2)
RBC # BLD AUTO: 3.21 M/UL (ref 3.8–5.2)
RBC MORPH BLD: ABNORMAL
SERVICE CMNT-IMP: ABNORMAL
SODIUM SERPL-SCNC: 141 MMOL/L (ref 136–145)
WBC # BLD AUTO: 21.6 K/UL (ref 3.6–11)

## 2021-07-08 PROCEDURE — 82962 GLUCOSE BLOOD TEST: CPT

## 2021-07-08 PROCEDURE — 74011250636 HC RX REV CODE- 250/636: Performed by: STUDENT IN AN ORGANIZED HEALTH CARE EDUCATION/TRAINING PROGRAM

## 2021-07-08 PROCEDURE — 74011250636 HC RX REV CODE- 250/636: Performed by: INTERNAL MEDICINE

## 2021-07-08 PROCEDURE — 80053 COMPREHEN METABOLIC PANEL: CPT

## 2021-07-08 PROCEDURE — 74011000258 HC RX REV CODE- 258: Performed by: INTERNAL MEDICINE

## 2021-07-08 PROCEDURE — 36415 COLL VENOUS BLD VENIPUNCTURE: CPT

## 2021-07-08 PROCEDURE — 85025 COMPLETE CBC W/AUTO DIFF WBC: CPT

## 2021-07-08 PROCEDURE — 97116 GAIT TRAINING THERAPY: CPT

## 2021-07-08 PROCEDURE — 74011636637 HC RX REV CODE- 636/637: Performed by: HOSPITALIST

## 2021-07-08 PROCEDURE — 74011250637 HC RX REV CODE- 250/637: Performed by: STUDENT IN AN ORGANIZED HEALTH CARE EDUCATION/TRAINING PROGRAM

## 2021-07-08 PROCEDURE — 74011250637 HC RX REV CODE- 250/637: Performed by: HOSPITALIST

## 2021-07-08 PROCEDURE — 65270000029 HC RM PRIVATE

## 2021-07-08 PROCEDURE — 97535 SELF CARE MNGMENT TRAINING: CPT

## 2021-07-08 RX ORDER — HEPARIN SODIUM 1000 [USP'U]/ML
10000 INJECTION, SOLUTION INTRAVENOUS; SUBCUTANEOUS
Status: ACTIVE | OUTPATIENT
Start: 2021-07-08 | End: 2021-07-08

## 2021-07-08 RX ORDER — HEPARIN SODIUM 1000 [USP'U]/ML
INJECTION, SOLUTION INTRAVENOUS; SUBCUTANEOUS
Status: DISPENSED
Start: 2021-07-08 | End: 2021-07-08

## 2021-07-08 RX ADMIN — HEPARIN SODIUM 5000 UNITS: 5000 INJECTION INTRAVENOUS; SUBCUTANEOUS at 18:32

## 2021-07-08 RX ADMIN — PIPERACILLIN AND TAZOBACTAM 3.38 G: 3; .375 INJECTION, POWDER, LYOPHILIZED, FOR SOLUTION INTRAVENOUS at 07:23

## 2021-07-08 RX ADMIN — INSULIN GLARGINE 15 UNITS: 100 INJECTION, SOLUTION SUBCUTANEOUS at 22:15

## 2021-07-08 RX ADMIN — INSULIN LISPRO 2 UNITS: 100 INJECTION, SOLUTION INTRAVENOUS; SUBCUTANEOUS at 11:30

## 2021-07-08 RX ADMIN — ACETAMINOPHEN 650 MG: 325 TABLET ORAL at 22:25

## 2021-07-08 RX ADMIN — Medication 10 ML: at 13:29

## 2021-07-08 RX ADMIN — BENZTROPINE MESYLATE 0.5 MG: 1 TABLET ORAL at 17:46

## 2021-07-08 RX ADMIN — VERAPAMIL HYDROCHLORIDE 240 MG: 120 TABLET, FILM COATED, EXTENDED RELEASE ORAL at 08:31

## 2021-07-08 RX ADMIN — BENZTROPINE MESYLATE 0.5 MG: 1 TABLET ORAL at 08:31

## 2021-07-08 RX ADMIN — Medication 1 CAPSULE: at 08:31

## 2021-07-08 RX ADMIN — PIPERACILLIN AND TAZOBACTAM 3.38 G: 3; .375 INJECTION, POWDER, LYOPHILIZED, FOR SOLUTION INTRAVENOUS at 13:29

## 2021-07-08 RX ADMIN — PANTOPRAZOLE SODIUM 40 MG: 40 TABLET, DELAYED RELEASE ORAL at 07:24

## 2021-07-08 RX ADMIN — HEPARIN SODIUM 5000 UNITS: 5000 INJECTION INTRAVENOUS; SUBCUTANEOUS at 11:26

## 2021-07-08 RX ADMIN — PIPERACILLIN AND TAZOBACTAM 3.38 G: 3; .375 INJECTION, POWDER, LYOPHILIZED, FOR SOLUTION INTRAVENOUS at 22:14

## 2021-07-08 RX ADMIN — INSULIN LISPRO 3 UNITS: 100 INJECTION, SOLUTION INTRAVENOUS; SUBCUTANEOUS at 17:47

## 2021-07-08 RX ADMIN — RISPERIDONE 3 MG: 1 TABLET ORAL at 08:31

## 2021-07-08 RX ADMIN — ATORVASTATIN CALCIUM 10 MG: 10 TABLET, FILM COATED ORAL at 22:25

## 2021-07-08 RX ADMIN — Medication 10 ML: at 07:23

## 2021-07-08 NOTE — PROGRESS NOTES
Problem: Mobility Impaired (Adult and Pediatric)  Goal: *Acute Goals and Plan of Care (Insert Text)  Description: FUNCTIONAL STATUS PRIOR TO ADMISSION: Patient deemed a poor historian at the time of eval. She reported that she was independent and active without use of DME.    HOME SUPPORT PRIOR TO ADMISSION: The patient lived alone and has a son locally. It is unclear how much support he is able to provide upon discharge. [place] [facility] Goals  Initiated 7/1/2021  1. Patient will move from supine to sit and sit to supine , scoot up and down, and roll side to side in bed with independence within 7 day(s). 2.  Patient will transfer from bed to chair and chair to bed with independence using the least restrictive device within 7 day(s). 3.  Patient will perform sit to stand with independence within 7 day(s). 4.  Patient will ambulate with independence for 300 feet with the least restrictive device within 7 day(s). 5.  Patient will ascend/descend 12 stairs with one handrail(s) with modified independence within 7 day(s). Outcome: Progressing Towards Goal  PHYSICAL THERAPY TREATMENT  Patient: Bandar Gandhi (3 y.o. female)  Date: 7/8/2021  [facility]: [facility] [A41.9] <principal problem not specified>       Precautions: Fall, Bed Alarm  Chart, physical therapy assessment, plan of care and goals were reviewed. ASSESSMENT  Patient continues with skilled PT services and is progressing towards goals. Making progress towards goals and able to increase gait distance this date. Noted moderate path deviations and narrow base of support during gait. No overt LOB and patient reports this is common in the am at home. Discussed a potential cane trial next session and patient deferred. Making overall progress but noted periodic confusion per chart and mild balance deficits. Patient lives alone in a 2 story home. She may  benefit from HHPT for safety evaluation and balance training.    Current Level of Function Impacting Discharge (mobility/balance): SBA for mobility    Other factors to consider for discharge: lives alone, periodic confusion         PLAN :  Patient continues to benefit from skilled intervention to address the above impairments. Continue treatment per established plan of care. to address goals. Recommendation for discharge: (in order for the patient to meet his/her long term goals)  HHPT vs none    This discharge recommendation:  Has not yet been discussed the attending provider and/or case management    IF patient discharges home will need the following DME: patient owns DME required for discharge       SUBJECTIVE:   Patient stated No, I don't want a cane.     OBJECTIVE DATA SUMMARY:   Critical Behavior:  Neurologic State: Alert, Appropriate for age  Orientation Level: Oriented X4  Cognition: Impaired decision making  Safety/Judgement: Decreased insight into deficits, Decreased awareness of need for safety, Decreased awareness of need for assistance  Functional Mobility Training:  Bed Mobility:                    Transfers:  Sit to Stand: Supervision  Stand to Sit: Supervision                             Balance:  Sitting: Intact; Without support  Standing: Intact; Without support  Standing - Static: Good  Standing - Dynamic : Good;Fair  Ambulation/Gait Training:  Distance (ft): 250 Feet (ft)     Ambulation - Level of Assistance: Stand-by assistance        Gait Abnormalities: Decreased step clearance; Path deviations        Base of Support: Widened                             Stairs: Activity Tolerance:   Good    After treatment patient left in no apparent distress:   Sitting in chair and Call bell within reach    COMMUNICATION/COLLABORATION:   The patients plan of care was discussed with: Registered nurse.      Mayte Singleton, PT, DPT   Time Calculation: 11 mins

## 2021-07-08 NOTE — PROGRESS NOTES
Problem: Self Care Deficits Care Plan (Adult)  Goal: *Acute Goals and Plan of Care (Insert Text)  Description:   FUNCTIONAL STATUS PRIOR TO ADMISSION:Patient is a poor historian at this time. However, per chart, patient was independent with ADLs at baseline with h/o PD and a mood disorder. HOME SUPPORT: The patient lived alone with a son who lives nearby to provide assistance. Occupational Therapy Goals  Initiated 7/1/2021, revised 07/08/21  1. Patient will perform upper body dressing with independence within 7 day(s). 2.  Patient will perform lower body dressing with independence within 7 day(s). 3.  Patient will perform bathing with independence within 7 day(s). 4.  Patient will perform toilet transfers with independence within 7 day(s). 5.  Patient will perform all aspects of toileting with independence within 7 day(s). 6.  Patient will participate in upper extremity therapeutic exercise/activities with supervision/set-up for 5 minutes within 7 day(s). 7.  Patient will utilize energy conservation techniques during functional activities with verbal cues within 5 day(s). Outcome: Not Met     OCCUPATIONAL THERAPY TREATMENT/WEEKLY RE-ASSESSMENT  Patient: Tatiana Leal (72 y.o. female)  Date: 7/8/2021  [facility]: [facility] [A41.[facility]] <principal problem not specified>       Precautions: Fall, Bed Alarm  Chart, occupational therapy assessment, plan of care, and goals were reviewed. ASSESSMENT  Patient continues with skilled OT services and is slowly progressing towards goals. Pt has made good progress and is completing tasks with overall supervision for mobility and mod I with overall ADLs. Pt is mostly limited by higher level balance and cognition. Pt presents with decreased STM and lack of safety awareness, and required mod cues for safety during session (reaching significantly out of CRISTIANA to trashcan pulling on IV line, attempting to ambulate to bathroom and leaving IV pole).  At this time, pt may benefit from 1-2 more visits to address higher level ADLs and IADLs to maximize functional independence as pt lives alone. At this time recommending pt have 24/7 supervision and A from family at d/c for safety and may benefit from [facility] vs no follow up pending continued medical and cognitive progression. Current Level of Function Impacting Discharge (ADLs): supervision-mod I    Other factors to consider for discharge: Pt lives alone; cognition         PLAN :  Goals have been updated based on progression since last assessment. Patient continues to benefit from skilled intervention to address the above impairments. Continue to follow patient 3 times a week to address goals. Recommend with staff: Parisa All for meals    Recommend next OT session: 1-2 more sessions to address higher level ADLs     Recommendation for discharge: (in order for the patient to meet his/her long term goals)  Occupational therapy at least 2 days/week in the home AND ensure assist and/or supervision for safety with transfers, IADLs    This discharge recommendation:  A follow-up discussion with the attending provider and/or case management is planned    IF patient discharges home will need the following DME: Pt owns equipment necessary for d/c       SUBJECTIVE:   Patient stated I move around by myself.     OBJECTIVE DATA SUMMARY:   Cognitive/Behavioral Status:  Neurologic State: Alert; Appropriate for age  Orientation Level: Oriented X4  Cognition: Impaired decision making             Functional Mobility and Transfers for ADLs:  Bed Mobility:       Transfers:             Balance:  Sitting: Intact; Without support  Standing: Intact; Without support  Standing - Static: Good  Standing - Dynamic : Good    ADL Intervention:       Grooming  Grooming Assistance: Independent  Position Performed: Seated in chair  Washing Face: Independent    Upper Body Bathing  Bathing Assistance: Independent  Position Performed: Seated in chair    Lower Body Bathing  Bathing Assistance: Modified independent  Perineal  : Modified independent  Position Performed: Standing  Cues:  (cues for safety to manage IV line)  Lower Body : Modified independent  Position Performed: Seated in chair;Standing    Upper Body Dressing Assistance  Dressing Assistance: Modified independent  Hospital Gown: Modified independent    Lower Body Dressing Assistance  Dressing Assistance: Modified independent  Underpants: Modified independent              Therapeutic Exercises:       Pain:  0/10    Activity Tolerance:   Good    After treatment patient left in no apparent distress:   Sitting in chair, Call bell within reach and RN notified pt up in chair    COMMUNICATION/COLLABORATION:   The patients plan of care was discussed with: Physical therapist and Registered nurse.      Sacha Hanna  Time Calculation: 24 mins

## 2021-07-08 NOTE — PROGRESS NOTES
FREDY:  RUR: 15%    Disposition:  Home with Home Health PT/OT Friday 7/9/21. Home health orders received. Referrals sent to local agencies. CM awaiting response.      Billie Tompkins, [address] [facility], [address] [place] [address] normal...

## 2021-07-08 NOTE — PROGRESS NOTES
[facility]                                                                                          Hospitalist Progress Note  Jose Carlos Cagle MD  Answering service: [phone] from in house phone        Date of Service:  2021  NAME:  Kwame Marino  :  1955  MRN:  [id number]      Admission Summary:     Kwame Marino is a 74 y.o. female with hx of NIDDM II, Dyslipidemia, Parkinson's, Mood disorder, PUD who presented to ed for confusion and poor po intake. Patient is seen and examined at bedside. She is alert and oriented to person and place but still mildly confused and provides limited history. States she has had persistent nausea over the last few days as well as lower abdominal pain. Per chart review, son reported that patient over the last 3 days has become increasingly confused. The patient denies any fever, chills, chest pain, cough, congestion, recent illness, palpitations, or dysuria.     Interval history / Subjective:     Pt has no complains, WBC scan underway , discussed with the pt and verbalized understanding  Seen by ID as well , vitals stable     Assessment & Plan:     Severe sepsis secondary to acute cystitis POA  -iv rocephin switched to IV zosyn on  and tutu Q, IVF discontinued  -fever improving, last fever was 101 on , temp 101.8 on ,   -had tachycardic, tachypenic, RR 33, leukocytosis, AMANDEEP, lactic acid 2.4 now improving to 1.6  -leukocytosis trending up 21    -urine cx E Coli  -blood cx grow on  e coli, sensitive to ceftriaxone   -repeated blood cx  no growth so far  -CT abdomen pelvis on  small bilateral pleural effusions, mild pelvic fluid   -ID on board    T2DM well controlled   -A1c 6.8  -continue lantus 15 units, sliding scale and monitor finger stick glucose   -home glimepiride on hold    AMANDEEP- resolved   -creatinine improving with IVF, Cr 1.14 from 2.81, discontinue IVF  -avoid nephrotoxin   -monitor renal function     Anemia likely due to chronic disease   -stable, Hgb 8.7  -monitor H/H    Hypokalemia  -replaced and resolved    HTN  -BP not at goal, continue verapamil , monitor BP    Mood disorder   -stable, continue on cogentin and risperidone    Dyslipidemia   -stable, continue lipitor    Hx of PUD  -stable, continue protonix    Acute metabolic encephalopathy resolved aao x3     Hx of Parkinson's disease   -not on medication  -PT/OT    Code status: Full Code  DVT prophylaxis: heparin    Care Plan discussed with: Patient/Family, Nurse and   Anticipated Disposition: Home with [facility], PT/OT  Anticipated Discharge: Greater than 48 hours     Zuleima Alonzo [facility] Problems  Date Reviewed: 10/5/2016        Codes Class Noted POA    Sepsis ([facility] [address] ICD-10-CM: A41.9  ICD-9-CM: 038.9, 995.91  6/30/2021 Unknown                 Vital Signs:    Last 24hrs VS reviewed since prior progress note. Most recent are:  Visit Vitals  BP (!) 174/88 (BP 1 Location: Right arm, BP Patient Position: At rest)   Pulse 91   Temp 98 °F (36.7 °C)   Resp 17   Ht 5' 3\" (1.6 m)   Wt 98.3 kg (216 lb 11.2 oz)   SpO2 96%   BMI 38.39 kg/m²         Intake/Output Summary (Last 24 hours) at 7/8/2021 6321  Last data filed at 7/8/2021 9000  Gross per 24 hour   Intake 320 ml   Output    Net 320 ml        Physical Examination:     I had a face to face encounter with this patient and independently examined them on 7/8/2021 as outlined below:          Constitutional:  No acute distress, cooperative, pleasant    ENT:  Oral mucosa moist, oropharynx benign. Resp:  CTA bilaterally. No wheezing/rhonchi/rales. No accessory muscle use   CV:  Regular rhythm, normal rate, no murmurs, gallops, rubs    GI:  Soft, non distended, non tender.  normoactive bowel sounds, no hepatosplenomegaly     Musculoskeletal:  No edema,     Neurologic:  Conscious and alert, well oriented, moves all extremities, CN II-XII grossly intact Data Review:    Review and/or order of clinical lab test  Review and/or order of tests in the radiology section of CPT  Review and/or order of tests in the medicine section of CPT      Labs:     Recent Labs     07/08/21 0239 07/07/21  0808   WBC 21.6* 22.0*   HGB 8.7* 9.7*   HCT 26.5* 29.3*   * 497*     Recent Labs     07/08/21  0239 07/07/21  0808 07/06/21  0309    140 139   K 3.9 4.0 3.3*   * 110* 109*   CO2 25 26 25   BUN 11 9 12   CREA 1.12* 1.14* 1.20*   * 130* 168*   CA 9.0 9.5 8.8     Recent Labs     07/08/21 0239 07/06/21  0309   ALT 91* 62    121*   TBILI 0.4 0.4   TP 6.1* 6.5   ALB 2.6* 2.3*   GLOB 3.5 4.2*     No results for input(s): INR, PTP, APTT, INREXT, INREXT in the last 72 hours. No results for input(s): FE, TIBC, PSAT, FERR in the last 72 hours. No results found for: FOL, RBCF   No results for input(s): PH, PCO2, PO2 in the last 72 hours. No results for input(s): CPK, CKNDX, TROIQ in the last 72 hours.     No lab exists for component: CPKMB  No results found for: CHOL, CHOLX, CHLST, CHOLV, HDL, HDLP, LDL, LDLC, DLDLP, TGLX, TRIGL, TRIGP, CHHD, CHHDX  Lab Results   Component Value Date/Time    Glucose (POC) 123 (H) 07/08/2021 06:58 AM    Glucose (POC) 162 (H) 07/07/2021 08:53 PM    Glucose (POC) 138 (H) 07/07/2021 04:45 PM    Glucose (POC) 155 (H) 07/07/2021 10:57 AM    Glucose (POC) 138 (H) 07/07/2021 06:14 AM     Lab Results   Component Value Date/Time    Color YELLOW/STRAW 06/30/2021 06:58 PM    Appearance CLOUDY (A) 06/30/2021 06:58 PM    Specific gravity 1.025 06/30/2021 06:58 PM    pH (UA) 5.5 06/30/2021 06:58 PM    Protein 100 (A) 06/30/2021 06:58 PM    Glucose Negative 06/30/2021 06:58 PM    Ketone TRACE (A) 06/30/2021 06:58 PM    Urobilinogen 1.0 06/30/2021 06:58 PM    Nitrites Negative 06/30/2021 06:58 PM    Leukocyte Esterase MODERATE (A) 06/30/2021 06:58 PM    Epithelial cells FEW 06/30/2021 06:58 PM    Bacteria 3+ (A) 06/30/2021 06:58 PM    WBC  06/30/2021 06:58 PM    RBC 0-5 06/30/2021 06:58 PM         Medications Reviewed:     Current Facility-Administered Medications   Medication Dose Route Frequency    heparin (porcine) 1,000 unit/mL injection 10,000 Units  10,000 Units Other RAD ONCE    heparin (porcine) 1,000 unit/mL injection        piperacillin-tazobactam (ZOSYN) 3.375 g in 0.9% sodium chloride (MBP/ADV) 100 mL MBP  3.375 g IntraVENous Q8H    benztropine (COGENTIN) tablet 0.5 mg  0.5 mg Oral BID    risperiDONE (RisperDAL) tablet 3 mg  3 mg Oral DAILY    pantoprazole (PROTONIX) tablet 40 mg  40 mg Oral ACB    verapamil ER (CALAN-SR) tablet 240 mg  240 mg Oral DAILY WITH BREAKFAST    atorvastatin (LIPITOR) tablet 10 mg  10 mg Oral QHS    sodium chloride (NS) flush 5-40 mL  5-40 mL IntraVENous Q8H    sodium chloride (NS) flush 5-40 mL  5-40 mL IntraVENous PRN    acetaminophen (TYLENOL) tablet 650 mg  650 mg Oral Q6H PRN    Or    acetaminophen (TYLENOL) suppository 650 mg  650 mg Rectal Q6H PRN    polyethylene glycol (MIRALAX) packet 17 g  17 g Oral DAILY PRN    ondansetron (ZOFRAN ODT) tablet 4 mg  4 mg Oral Q8H PRN    Or    ondansetron (ZOFRAN) injection 4 mg  4 mg IntraVENous Q6H PRN    heparin (porcine) injection 5,000 Units  5,000 Units SubCUTAneous Q8H    insulin glargine (LANTUS) injection 15 Units  15 Units SubCUTAneous QHS    insulin lispro (HUMALOG) injection   SubCUTAneous AC&HS    glucose chewable tablet 16 g  4 Tablet Oral PRN    dextrose (D50W) injection syrg 12.5-25 g  12.5-25 g IntraVENous PRN    glucagon (GLUCAGEN) injection 1 mg  1 mg IntraMUSCular PRN    L.acidophilus-paracasei-S.thermophil-bifidobacter (RISAQUAD) 8 billion cell capsule  1 Capsule Oral DAILY     ______________________________________________________________________  EXPECTED LENGTH OF STAY: 4d 19h  ACTUAL LENGTH OF STAY:          8                 Paco Camara MD

## 2021-07-08 NOTE — PROGRESS NOTES
ID Progress Note  2021    Subjective: Only minimally elevated temp. WBC still up. No dyspnea, abdominal pain, headache or sore throat. No diarrhea. ROS: No anaphylaxis, seizures, syncope, hematemesis, hematochezia     Objective:     Vitals:   Visit Vitals  BP (!) 174/88 (BP 1 Location: Right arm, BP Patient Position: At rest)   Pulse 91   Temp 98 °F (36.7 °C)   Resp 17   Ht 5' 3\" (1.6 m)   Wt 98.3 kg (216 lb 11.2 oz)   SpO2 96%   BMI 38.39 kg/m²        Tmax:  Temp (24hrs), Av.8 °F (37.1 °C), Min:98 °F (36.7 °C), Max:99.7 °F (37.6 °C)      Exam:    Not in distress  Pink conjunctivae, anicteric sclerae  No cervical lymphdenopathy   Lung clear, no rales, wheezes or rhonchi   Heart: s1, s2, RRR, no murmurs rubs or clicks  Abdomen: soft nontender, no guarding or rebound  Knees not warm or tender  Speech fluent     Labs:   Lab Results   Component Value Date/Time    WBC 21.6 (H) 2021 02:39 AM    HGB 8.7 (L) 2021 02:39 AM    HCT 26.5 (L) 2021 02:39 AM    PLATELET 399 (H)  02:39 AM    MCV 82.6 2021 02:39 AM     Lab Results   Component Value Date/Time    Sodium 141 2021 02:39 AM    Potassium 3.9 2021 02:39 AM    Chloride 109 (H) 2021 02:39 AM    CO2 25 2021 02:39 AM    Anion gap 7 2021 02:39 AM    Glucose 119 (H) 2021 02:39 AM    BUN 11 2021 02:39 AM    Creatinine 1.12 (H) 2021 02:39 AM    BUN/Creatinine ratio 10 (L) 2021 02:39 AM    GFR est AA 59 (L) 2021 02:39 AM    GFR est non-AA 48 (L) 2021 02:39 AM    Calcium 9.0 2021 02:39 AM    Bilirubin, total 0.4 2021 02:39 AM    Alk.  phosphatase 103 2021 02:39 AM    Protein, total 6.1 (L) 2021 02:39 AM    Albumin 2.6 (L) 2021 02:39 AM    Globulin 3.5 2021 02:39 AM    A-G Ratio 0.7 (L) 2021 02:39 AM    ALT (SGPT) 91 (H) 2021 02:39 AM           Assessment:     #1 E. coli bacteremia     #2 E. coli UTI     #3 leukocytosis     #4 diabetes     #5 history of breast cancer     #6 acute renal failurebetter     #7 parkinsonism  Recommendations:     Does not look septic but wbc elevated. WBC scan cancelled today due to her IV line blowing. Will be done Monday now.      Continue bdudy Amaya MD

## 2021-07-08 NOTE — PROGRESS NOTES
Problem: Falls - Risk of  Goal: *Absence of Falls  Description: Document Duarte Hilton Fall Risk and appropriate interventions in the flowsheet.   Outcome: Progressing Towards Goal  Note: Fall Risk Interventions:  Mobility Interventions: Patient to call before getting OOB    Mentation Interventions: Adequate sleep, hydration, pain control    Medication Interventions: Patient to call before getting OOB    Elimination Interventions: Call light in reach

## 2021-07-09 LAB
ANION GAP SERPL CALC-SCNC: 7 MMOL/L (ref 5–15)
BASOPHILS # BLD: 0.1 K/UL (ref 0–0.1)
BASOPHILS NFR BLD: 0 % (ref 0–1)
BUN SERPL-MCNC: 11 MG/DL (ref 6–20)
BUN/CREAT SERPL: 9 (ref 12–20)
CALCIUM SERPL-MCNC: 9.3 MG/DL (ref 8.5–10.1)
CHLORIDE SERPL-SCNC: 110 MMOL/L (ref 97–108)
CO2 SERPL-SCNC: 25 MMOL/L (ref 21–32)
CREAT SERPL-MCNC: 1.19 MG/DL (ref 0.55–1.02)
DIFFERENTIAL METHOD BLD: ABNORMAL
EOSINOPHIL # BLD: 0.1 K/UL (ref 0–0.4)
EOSINOPHIL NFR BLD: 0 % (ref 0–7)
ERYTHROCYTE [DISTWIDTH] IN BLOOD BY AUTOMATED COUNT: 15.5 % (ref 11.5–14.5)
GLUCOSE BLD STRIP.AUTO-MCNC: 150 MG/DL (ref 65–117)
GLUCOSE BLD STRIP.AUTO-MCNC: 153 MG/DL (ref 65–117)
GLUCOSE BLD STRIP.AUTO-MCNC: 177 MG/DL (ref 65–117)
GLUCOSE BLD STRIP.AUTO-MCNC: 182 MG/DL (ref 65–117)
GLUCOSE SERPL-MCNC: 114 MG/DL (ref 65–100)
HCT VFR BLD AUTO: 25.3 % (ref 35–47)
HGB BLD-MCNC: 8.3 G/DL (ref 11.5–16)
IMM GRANULOCYTES # BLD AUTO: 0.3 K/UL (ref 0–0.04)
IMM GRANULOCYTES NFR BLD AUTO: 1 % (ref 0–0.5)
LYMPHOCYTES # BLD: 2 K/UL (ref 0.8–3.5)
LYMPHOCYTES NFR BLD: 9 % (ref 12–49)
MCH RBC QN AUTO: 27.5 PG (ref 26–34)
MCHC RBC AUTO-ENTMCNC: 32.8 G/DL (ref 30–36.5)
MCV RBC AUTO: 83.8 FL (ref 80–99)
MONOCYTES # BLD: 0.8 K/UL (ref 0–1)
MONOCYTES NFR BLD: 4 % (ref 5–13)
NEUTS SEG # BLD: 18.1 K/UL (ref 1.8–8)
NEUTS SEG NFR BLD: 86 % (ref 32–75)
NRBC # BLD: 0 K/UL (ref 0–0.01)
NRBC BLD-RTO: 0 PER 100 WBC
PLATELET # BLD AUTO: 564 K/UL (ref 150–400)
PMV BLD AUTO: 9.4 FL (ref 8.9–12.9)
POTASSIUM SERPL-SCNC: 4 MMOL/L (ref 3.5–5.1)
RBC # BLD AUTO: 3.02 M/UL (ref 3.8–5.2)
SERVICE CMNT-IMP: ABNORMAL
SODIUM SERPL-SCNC: 142 MMOL/L (ref 136–145)
WBC # BLD AUTO: 21.2 K/UL (ref 3.6–11)

## 2021-07-09 PROCEDURE — 97535 SELF CARE MNGMENT TRAINING: CPT

## 2021-07-09 PROCEDURE — 80048 BASIC METABOLIC PNL TOTAL CA: CPT

## 2021-07-09 PROCEDURE — 36415 COLL VENOUS BLD VENIPUNCTURE: CPT

## 2021-07-09 PROCEDURE — 85025 COMPLETE CBC W/AUTO DIFF WBC: CPT

## 2021-07-09 PROCEDURE — 74011250636 HC RX REV CODE- 250/636: Performed by: STUDENT IN AN ORGANIZED HEALTH CARE EDUCATION/TRAINING PROGRAM

## 2021-07-09 PROCEDURE — 65270000029 HC RM PRIVATE

## 2021-07-09 PROCEDURE — 74011250637 HC RX REV CODE- 250/637: Performed by: STUDENT IN AN ORGANIZED HEALTH CARE EDUCATION/TRAINING PROGRAM

## 2021-07-09 PROCEDURE — 82962 GLUCOSE BLOOD TEST: CPT

## 2021-07-09 PROCEDURE — 88275 CYTOGENETICS 100-300: CPT

## 2021-07-09 PROCEDURE — 76937 US GUIDE VASCULAR ACCESS: CPT

## 2021-07-09 PROCEDURE — 74011250636 HC RX REV CODE- 250/636: Performed by: INTERNAL MEDICINE

## 2021-07-09 PROCEDURE — 74011000258 HC RX REV CODE- 258: Performed by: INTERNAL MEDICINE

## 2021-07-09 PROCEDURE — 74011636637 HC RX REV CODE- 636/637: Performed by: HOSPITALIST

## 2021-07-09 PROCEDURE — 74011250637 HC RX REV CODE- 250/637: Performed by: HOSPITALIST

## 2021-07-09 PROCEDURE — 88271 CYTOGENETICS DNA PROBE: CPT

## 2021-07-09 PROCEDURE — 77030020365 HC SOL INJ SOD CL 0.9% 50ML

## 2021-07-09 PROCEDURE — C1751 CATH, INF, PER/CENT/MIDLINE: HCPCS

## 2021-07-09 RX ADMIN — Medication 10 ML: at 16:59

## 2021-07-09 RX ADMIN — BENZTROPINE MESYLATE 0.5 MG: 1 TABLET ORAL at 09:55

## 2021-07-09 RX ADMIN — ATORVASTATIN CALCIUM 10 MG: 10 TABLET, FILM COATED ORAL at 21:52

## 2021-07-09 RX ADMIN — Medication 10 ML: at 21:54

## 2021-07-09 RX ADMIN — Medication 1 CAPSULE: at 09:55

## 2021-07-09 RX ADMIN — PANTOPRAZOLE SODIUM 40 MG: 40 TABLET, DELAYED RELEASE ORAL at 06:34

## 2021-07-09 RX ADMIN — INSULIN GLARGINE 15 UNITS: 100 INJECTION, SOLUTION SUBCUTANEOUS at 21:52

## 2021-07-09 RX ADMIN — RISPERIDONE 3 MG: 1 TABLET ORAL at 09:55

## 2021-07-09 RX ADMIN — BENZTROPINE MESYLATE 0.5 MG: 1 TABLET ORAL at 19:37

## 2021-07-09 RX ADMIN — PIPERACILLIN AND TAZOBACTAM 3.38 G: 3; .375 INJECTION, POWDER, LYOPHILIZED, FOR SOLUTION INTRAVENOUS at 16:59

## 2021-07-09 RX ADMIN — INSULIN LISPRO 2 UNITS: 100 INJECTION, SOLUTION INTRAVENOUS; SUBCUTANEOUS at 06:35

## 2021-07-09 RX ADMIN — VERAPAMIL HYDROCHLORIDE 240 MG: 120 TABLET, FILM COATED, EXTENDED RELEASE ORAL at 09:55

## 2021-07-09 RX ADMIN — HEPARIN SODIUM 5000 UNITS: 5000 INJECTION INTRAVENOUS; SUBCUTANEOUS at 02:49

## 2021-07-09 RX ADMIN — HEPARIN SODIUM 5000 UNITS: 5000 INJECTION INTRAVENOUS; SUBCUTANEOUS at 19:37

## 2021-07-09 RX ADMIN — INSULIN LISPRO 2 UNITS: 100 INJECTION, SOLUTION INTRAVENOUS; SUBCUTANEOUS at 16:59

## 2021-07-09 RX ADMIN — Medication 10 ML: at 06:37

## 2021-07-09 RX ADMIN — PIPERACILLIN AND TAZOBACTAM 3.38 G: 3; .375 INJECTION, POWDER, LYOPHILIZED, FOR SOLUTION INTRAVENOUS at 06:38

## 2021-07-09 RX ADMIN — ACETAMINOPHEN 650 MG: 325 TABLET ORAL at 19:38

## 2021-07-09 NOTE — PROGRESS NOTES
ID Progress Note  2021    Subjective: Only minimally elevated temp. WBC still up. No dyspnea, abdominal pain, headache or sore throat. No diarrhea. ROS: No anaphylaxis, seizures, syncope, hematemesis, hematochezia     Objective:     Vitals:   Visit Vitals  BP (!) 146/94 (BP 1 Location: Left upper arm, BP Patient Position: At rest)   Pulse 98   Temp 98.2 °F (36.8 °C)   Resp 18   Ht 5' 3\" (1.6 m)   Wt 98.3 kg (216 lb 11.2 oz)   SpO2 97%   BMI 38.39 kg/m²        Tmax:  Temp (24hrs), Av.4 °F (36.9 °C), Min:97.4 °F (36.3 °C), Max:99.6 °F (37.6 °C)      Exam:    Not in distress  Pink conjunctivae, anicteric sclerae  No cervical lymphdenopathy   Lung clear, no rales, wheezes or rhonchi   Heart: s1, s2, RRR, no murmurs rubs or clicks  Abdomen: soft nontender, no guarding or rebound  Knees not warm or tender  Speech fluent     Labs:   Lab Results   Component Value Date/Time    WBC 21.6 (H) 2021 02:39 AM    HGB 8.7 (L) 2021 02:39 AM    HCT 26.5 (L) 2021 02:39 AM    PLATELET 603 (H)  02:39 AM    MCV 82.6 2021 02:39 AM     Lab Results   Component Value Date/Time    Sodium 141 2021 02:39 AM    Potassium 3.9 2021 02:39 AM    Chloride 109 (H) 2021 02:39 AM    CO2 25 2021 02:39 AM    Anion gap 7 2021 02:39 AM    Glucose 119 (H) 2021 02:39 AM    BUN 11 2021 02:39 AM    Creatinine 1.12 (H) 2021 02:39 AM    BUN/Creatinine ratio 10 (L) 2021 02:39 AM    GFR est AA 59 (L) 2021 02:39 AM    GFR est non-AA 48 (L) 2021 02:39 AM    Calcium 9.0 2021 02:39 AM    Bilirubin, total 0.4 2021 02:39 AM    Alk.  phosphatase 103 2021 02:39 AM    Protein, total 6.1 (L) 2021 02:39 AM    Albumin 2.6 (L) 2021 02:39 AM    Globulin 3.5 2021 02:39 AM    A-G Ratio 0.7 (L) 2021 02:39 AM    ALT (SGPT) 91 (H) 2021 02:39 AM           Assessment:     #1 E. coli bacteremia     #2 E. coli UTI     #3 leukocytosis     #4 diabetes     #5 history of breast cancer     #6 acute renal failurebetter     #7 parkinsonism  Recommendations:     Does not look septic but wbc elevated. WBC scan cancelled yesterday due to her IV line blowing. Will be done Monday now. Continue zosyn. She has formed stools     I have asked Dr. Shelly Jurado to see her in case the leukocytosis is from a myeloproliferative cause. Team available over the weekend if needed.        Denita Paiz MD

## 2021-07-09 NOTE — PROGRESS NOTES
Pt is refusing insulin and heparin. RN attempted to educate pt on the importance of these medications. Pt kept stating \"no more needles. \" Will try again later and attempt to contact family.

## 2021-07-09 NOTE — PROGRESS NOTES
Physical Therapy  Attempted to have patient work with therapy but she is adamantly refusing. States she walked yesterday but will not today. Of note, she is also refusing to let nursing \"stick\" her again. Will follow up Monday. Recommend up with staff walking in the halls over the weekend 2-3/day.   Alban Hart, PT

## 2021-07-09 NOTE — PROGRESS NOTES
[address] [facility]  [facility]ospitalist [facility]                                                                                          Hospitalist Progress Note  Pedro Roa MD  Answering service: [phone] from in house phone        Date of Service:  2021  NAME:  Bandar Gandhi  :  1955  MRN:  [id number]      Admission Summary:     Bandar Gandhi is a 74 y.o. female with hx of NIDDM II, Dyslipidemia, Parkinson's, Mood disorder, PUD who presented to ed for confusion and poor po intake. Patient is seen and examined at bedside. She is alert and oriented to person and place but still mildly confused and provides limited history. States she has had persistent nausea over the last few days as well as lower abdominal pain. Per chart review, son reported that patient over the last 3 days has become increasingly confused. The patient denies any fever, chills, chest pain, cough, congestion, recent illness, palpitations, or dysuria.     Interval history / Subjective:     Pt has no complains, WBC scan - couldn't be dos edue to access issues  Pt seems stable labs requested, ID - will f/u recommendation     Assessment & Plan:     Severe sepsis secondary to acute cystitis POA  -iv rocephin switched to IV zosyn on  and tutu Q, IVF discontinued  -no fever just low grade   -leukocytosis - repeat lab  -urine cx E Coli  -blood cx grow on  e coli, sensitive to ceftriaxone   -repeated blood cx  no growth so far  -CT abdomen pelvis on  small bilateral pleural effusions, mild pelvic fluid   -ID on board awaiting WBC scan     T2DM well controlled   -A1c 6.8  -continue lantus 15 units, sliding scale and monitor finger stick glucose   -home glimepiride on hold    AMANDEEP- resolved   -creatinine improving with IVF, Cr 1.14 from 2.81, discontinue IVF  -avoid nephrotoxin   -requested labs     Anemia likely due to chronic disease   -stable, Hgb 8.7  -monitor H/H    Hypokalemia  -replaced and resolved    HTN  -BP not at goal, continue verapamil , monitor BP    Mood disorder   -stable, continue on cogentin and risperidone    Dyslipidemia   -stable, continue lipitor    Hx of PUD  -stable, continue protonix    Acute metabolic encephalopathy resolved aao x3     Hx of Parkinson's disease   -not on medication  -PT/OT    Code status: Full Code  DVT prophylaxis: heparin    Care Plan discussed with: Patient/Family, Nurse and   Anticipated Disposition: Home with [facility], PT/OT  Anticipated Discharge: Greater than 48 hours     [facility] [address] Problems  Date Reviewed: 10/5/2016        Codes Class Noted POA    Sepsis ([facility] [address] ICD-10-CM: A41.9  ICD-9-CM: 038.9, 995.91  6/30/2021 Unknown                 Vital Signs:    Last 24hrs VS reviewed since prior progress note. Most recent are:  Visit Vitals  BP (!) 146/94 (BP 1 Location: Left upper arm, BP Patient Position: At rest)   Pulse 98   Temp 98.2 °F (36.8 °C)   Resp 18   Ht 5' 3\" (1.6 m)   Wt 98.3 kg (216 lb 11.2 oz)   SpO2 97%   BMI 38.39 kg/m²       No intake or output data in the 24 hours ending 07/09/21 0944     Physical Examination:     I had a face to face encounter with this patient and independently examined them on 7/9/2021 as outlined below:          Constitutional:  No acute distress, cooperative, pleasant    ENT:  Oral mucosa moist, oropharynx benign. Resp:  CTA bilaterally. No wheezing/rhonchi/rales. No accessory muscle use   CV:  Regular rhythm, normal rate, no murmurs, gallops, rubs    GI:  Soft, non distended, non tender.  normoactive bowel sounds, no hepatosplenomegaly     Musculoskeletal:  No edema,     Neurologic:  Conscious and alert, well oriented, moves all extremities, CN II-XII grossly intact            Data Review:    Review and/or order of clinical lab test  Review and/or order of tests in the radiology section of CPT  Review and/or order of tests in the medicine section of CPT      Labs:     Recent Labs     07/08/21  0239 07/07/21  0808   WBC 21.6* 22.0*   HGB 8.7* 9.7*   HCT 26.5* 29.3*   * 497*     Recent Labs     07/08/21  0239 07/07/21  0808    140   K 3.9 4.0   * 110*   CO2 25 26   BUN 11 9   CREA 1.12* 1.14*   * 130*   CA 9.0 9.5     Recent Labs     07/08/21  0239   ALT 91*      TBILI 0.4   TP 6.1*   ALB 2.6*   GLOB 3.5     No results for input(s): INR, PTP, APTT, INREXT, INREXT in the last 72 hours. No results for input(s): FE, TIBC, PSAT, FERR in the last 72 hours. No results found for: FOL, RBCF   No results for input(s): PH, PCO2, PO2 in the last 72 hours. No results for input(s): CPK, CKNDX, TROIQ in the last 72 hours.     No lab exists for component: CPKMB  No results found for: CHOL, CHOLX, CHLST, CHOLV, HDL, HDLP, LDL, LDLC, DLDLP, TGLX, TRIGL, TRIGP, CHHD, CHHDX  Lab Results   Component Value Date/Time    Glucose (POC) 153 (H) 07/09/2021 06:16 AM    Glucose (POC) 142 (H) 07/08/2021 10:09 PM    Glucose (POC) 202 (H) 07/08/2021 05:36 PM    Glucose (POC) 194 (H) 07/08/2021 11:00 AM    Glucose (POC) 123 (H) 07/08/2021 06:58 AM     Lab Results   Component Value Date/Time    Color YELLOW/STRAW 06/30/2021 06:58 PM    Appearance CLOUDY (A) 06/30/2021 06:58 PM    Specific gravity 1.025 06/30/2021 06:58 PM    pH (UA) 5.5 06/30/2021 06:58 PM    Protein 100 (A) 06/30/2021 06:58 PM    Glucose Negative 06/30/2021 06:58 PM    Ketone TRACE (A) 06/30/2021 06:58 PM    Urobilinogen 1.0 06/30/2021 06:58 PM    Nitrites Negative 06/30/2021 06:58 PM    Leukocyte Esterase MODERATE (A) 06/30/2021 06:58 PM    Epithelial cells FEW 06/30/2021 06:58 PM    Bacteria 3+ (A) 06/30/2021 06:58 PM    WBC  06/30/2021 06:58 PM    RBC 0-5 06/30/2021 06:58 PM         Medications Reviewed:     Current Facility-Administered Medications   Medication Dose Route Frequency    piperacillin-tazobactam (ZOSYN) 3.375 g in 0.9% sodium chloride (MBP/ADV) 100 mL MBP  3.375 g IntraVENous Q8H    benztropine (COGENTIN) tablet 0.5 mg  0.5 mg Oral BID    risperiDONE (RisperDAL) tablet 3 mg  3 mg Oral DAILY    pantoprazole (PROTONIX) tablet 40 mg  40 mg Oral ACB    verapamil ER (CALAN-SR) tablet 240 mg  240 mg Oral DAILY WITH BREAKFAST    atorvastatin (LIPITOR) tablet 10 mg  10 mg Oral QHS    sodium chloride (NS) flush 5-40 mL  5-40 mL IntraVENous Q8H    sodium chloride (NS) flush 5-40 mL  5-40 mL IntraVENous PRN    acetaminophen (TYLENOL) tablet 650 mg  650 mg Oral Q6H PRN    Or    acetaminophen (TYLENOL) suppository 650 mg  650 mg Rectal Q6H PRN    polyethylene glycol (MIRALAX) packet 17 g  17 g Oral DAILY PRN    ondansetron (ZOFRAN ODT) tablet 4 mg  4 mg Oral Q8H PRN    Or    ondansetron (ZOFRAN) injection 4 mg  4 mg IntraVENous Q6H PRN    heparin (porcine) injection 5,000 Units  5,000 Units SubCUTAneous Q8H    insulin glargine (LANTUS) injection 15 Units  15 Units SubCUTAneous QHS    insulin lispro (HUMALOG) injection   SubCUTAneous AC&HS    glucose chewable tablet 16 g  4 Tablet Oral PRN    dextrose (D50W) injection syrg 12.5-25 g  12.5-25 g IntraVENous PRN    glucagon (GLUCAGEN) injection 1 mg  1 mg IntraMUSCular PRN    L.acidophilus-paracasei-S.thermophil-bifidobacter (RISAQUAD) 8 billion cell capsule  1 Capsule Oral DAILY     ______________________________________________________________________  EXPECTED LENGTH OF STAY: 4d 19h  ACTUAL LENGTH OF STAY:          9                 Pinky Cash MD

## 2021-07-09 NOTE — PROCEDURES
Midline Insertion and Progress Note    PRE-PROCEDURE VERIFICATION  Correct Procedure: yes  Correct Site:  yes  Temperature: Temp: 98.2 °F (36.8 °C), Temperature Source: Temp Source: Oral  Recent Labs     07/08/21  0239   BUN 11   CREA 1.12*   *   WBC 21.6*     Allergies: Patient has no known allergies. PROCEDURE DETAIL  A single lumen midline IV catheter was started for vascular access. The following documentation is in addition to the Midline properties in the lines/airways flowsheet :  Xylocaine 1% used intradermally  Lot #: [id number]  Catheter Total Length: 15 (cm)  External Catheter Length: 1 (cm)  Circumference: 38 (cm)  Vein Selection for Midline :left basilic  Complication Related to Insertion: none    Line is okay to use.  Report given to Allie Gottlieb

## 2021-07-09 NOTE — PROGRESS NOTES
Problem: Self Care Deficits Care Plan (Adult)  Goal: *Acute Goals and Plan of Care (Insert Text)  Description:   FUNCTIONAL STATUS PRIOR TO ADMISSION:Patient is a poor historian at this time. However, per chart, patient was independent with ADLs at baseline with h/o PD and a mood disorder. HOME SUPPORT: The patient lived alone with a son who lives nearby to provide assistance. Occupational Therapy Goals  Initiated 7/1/2021, revised 07/08/21  1. Patient will perform upper body dressing with independence within 7 day(s). 2.  Patient will perform lower body dressing with independence within 7 day(s). 3.  Patient will perform bathing with independence within 7 day(s). 4.  Patient will perform toilet transfers with independence within 7 day(s). 5.  Patient will perform all aspects of toileting with independence within 7 day(s). 6.  Patient will participate in upper extremity therapeutic exercise/activities with supervision/set-up for 5 minutes within 7 day(s). 7.  Patient will utilize energy conservation techniques during functional activities with verbal cues within 5 day(s). Outcome: Progressing Towards Goal  OCCUPATIONAL THERAPY TREATMENT  Patient: Tatiana Leal (22 y.o. female)  Date: 7/9/2021  Diagnosis: [facility]epsis ([facility] [A41.9] <principal problem not specified>       Precautions: Fall, Bed Alarm  Chart, occupational therapy assessment, plan of care, and goals were reviewed. ASSESSMENT  Patient continues with skilled OT services and is progressing towards goals. Patient received seated in chair and requesting assistance to snap sleeves on a hospital gown donned from front and one donned from the back. Completed gown for patient while she stood and turned around as needed without assist for balance. Patient requested to take a walk at this time, stopping to use bathroom along the way. Completed one length of dorsey and back with assist to manage IV pump.       Current Level of Function Impacting Discharge (ADLs): self care, toileting in bathroom and mobility without AD with supervision to Mod I    Other factors to consider for discharge: Lives alone         PLAN :  Patient continues to benefit from skilled intervention to address the above impairments. Continue treatment per established plan of care to address goals. Recommend with staff: Reji Draft in chair for meals and self care, toileting with supervision to Mod I    Recommend next OT session: POC    Recommendation for discharge: (in order for the patient to meet his/her long term goals)  Occupational therapy at least 2 days/week in the home  versus none    This discharge recommendation:  Has been made in collaboration with the attending provider and/or case management    IF patient discharges home will need the following DME: none       SUBJECTIVE:   Patient stated If I like you, I like you. If I don't like you, I don't like you.     OBJECTIVE DATA SUMMARY:   Cognitive/Behavioral Status:  Neurologic State: Alert  Orientation Level: Oriented X4  Cognition: Appropriate for age attention/concentration; Follows commands  Perception: Appears intact  Perseveration: No perseveration noted  Safety/Judgement: Awareness of environment    Functional Mobility and Transfers for ADLs:  Bed Mobility:       Transfers:  Sit to Stand: Supervision  Functional Transfers  Toilet Transfer : Modified independent       Balance:  Sitting: Intact; Without support  Standing: Intact; Without support  Standing - Static: Good  Standing - Dynamic : Good    ADL Intervention:       Grooming  Position Performed: Standing  Washing Hands: Modified independent          Toileting  Toileting Assistance: Supervision;Modified independent  Bladder Hygiene: Independent  Bowel Hygiene: Modified indpendent  Clothing Management: Supervision    Cognitive Retraining  Attention to Task: Multi-task  Following Commands:  Follows multi-step simple commands/directions  Safety/Judgement: Awareness of environment  Cues: Tactile cues provided;Verbal cues provided;Visual cues provided      Activity Tolerance:   Good    After treatment patient left in no apparent distress:   Sitting in chair and Call bell within reach    COMMUNICATION/COLLABORATION:   The patients plan of care was discussed with: Registered nurse.      TRACY Vuong  Time Calculation: 10 mins

## 2021-07-09 NOTE — PROGRESS NOTES
FREDY:  RUR: 15%     Disposition:  Home with Home Health PT/OT Friday 7/9/21. [facility] has accepted patient for servicing. Updates  On After visit summary. [facility], [address]    Medicare pt has received, reviewed, and signed 2nd IM letter informing them of their right to appeal the discharge. Signed copied has been placed on pt bedside chart.

## 2021-07-09 NOTE — CONSULTS
Patient seen, chart reviewed, note dictated. [id number]    73 y/o woman with a h/o BPD admitted with sepsis/confusion/cystitis. Found to have E. Coli bacteremia. Treated aggressively with IV Abx, but has had continued elevation of her WBC, composed primarily of mature neutrophils. Asked to see for evaluation of leukocytosis. 1. Leukocytosis: Agree with concern for occult leukemia or myeloproliferative d/o. She does not appear toxic today and has no complaint. I have spoken with her son by phone and have recommended testing with flow cytometery, bcr-abl, DIXON-2 with reflex to CALR/MPL with f/u in my office in two weeks. Son agrees to bring her. Thank you for consult. I have not signed her out to weekend rounding staff, but available if needed.      Jonathan Alarcon MD  Hem/Onc  [phone]

## 2021-07-10 LAB
GLUCOSE BLD STRIP.AUTO-MCNC: 128 MG/DL (ref 65–117)
GLUCOSE BLD STRIP.AUTO-MCNC: 135 MG/DL (ref 65–117)
GLUCOSE BLD STRIP.AUTO-MCNC: 155 MG/DL (ref 65–117)
GLUCOSE BLD STRIP.AUTO-MCNC: 177 MG/DL (ref 65–117)
SERVICE CMNT-IMP: ABNORMAL

## 2021-07-10 PROCEDURE — 74011250636 HC RX REV CODE- 250/636: Performed by: INTERNAL MEDICINE

## 2021-07-10 PROCEDURE — 36415 COLL VENOUS BLD VENIPUNCTURE: CPT

## 2021-07-10 PROCEDURE — 81270 JAK2 GENE: CPT

## 2021-07-10 PROCEDURE — 82962 GLUCOSE BLOOD TEST: CPT

## 2021-07-10 PROCEDURE — 65270000029 HC RM PRIVATE

## 2021-07-10 PROCEDURE — 74011250637 HC RX REV CODE- 250/637: Performed by: HOSPITALIST

## 2021-07-10 PROCEDURE — 81219 CALR GENE COM VARIANTS: CPT

## 2021-07-10 PROCEDURE — 74011250636 HC RX REV CODE- 250/636: Performed by: STUDENT IN AN ORGANIZED HEALTH CARE EDUCATION/TRAINING PROGRAM

## 2021-07-10 PROCEDURE — 74011636637 HC RX REV CODE- 636/637: Performed by: HOSPITALIST

## 2021-07-10 PROCEDURE — 74011250637 HC RX REV CODE- 250/637: Performed by: STUDENT IN AN ORGANIZED HEALTH CARE EDUCATION/TRAINING PROGRAM

## 2021-07-10 PROCEDURE — 74011000258 HC RX REV CODE- 258: Performed by: INTERNAL MEDICINE

## 2021-07-10 RX ADMIN — RISPERIDONE 3 MG: 1 TABLET ORAL at 10:07

## 2021-07-10 RX ADMIN — VERAPAMIL HYDROCHLORIDE 240 MG: 120 TABLET, FILM COATED, EXTENDED RELEASE ORAL at 07:19

## 2021-07-10 RX ADMIN — HEPARIN SODIUM 5000 UNITS: 5000 INJECTION INTRAVENOUS; SUBCUTANEOUS at 01:04

## 2021-07-10 RX ADMIN — ACETAMINOPHEN 650 MG: 325 TABLET ORAL at 20:28

## 2021-07-10 RX ADMIN — HEPARIN SODIUM 5000 UNITS: 5000 INJECTION INTRAVENOUS; SUBCUTANEOUS at 11:56

## 2021-07-10 RX ADMIN — PIPERACILLIN AND TAZOBACTAM 3.38 G: 3; .375 INJECTION, POWDER, LYOPHILIZED, FOR SOLUTION INTRAVENOUS at 01:03

## 2021-07-10 RX ADMIN — BENZTROPINE MESYLATE 0.5 MG: 1 TABLET ORAL at 10:07

## 2021-07-10 RX ADMIN — ATORVASTATIN CALCIUM 10 MG: 10 TABLET, FILM COATED ORAL at 21:49

## 2021-07-10 RX ADMIN — BENZTROPINE MESYLATE 0.5 MG: 1 TABLET ORAL at 19:00

## 2021-07-10 RX ADMIN — Medication 1 CAPSULE: at 10:07

## 2021-07-10 RX ADMIN — INSULIN GLARGINE 15 UNITS: 100 INJECTION, SOLUTION SUBCUTANEOUS at 21:49

## 2021-07-10 RX ADMIN — Medication 10 ML: at 07:19

## 2021-07-10 RX ADMIN — INSULIN LISPRO 2 UNITS: 100 INJECTION, SOLUTION INTRAVENOUS; SUBCUTANEOUS at 11:55

## 2021-07-10 RX ADMIN — PANTOPRAZOLE SODIUM 40 MG: 40 TABLET, DELAYED RELEASE ORAL at 07:19

## 2021-07-10 RX ADMIN — Medication 10 ML: at 21:50

## 2021-07-10 RX ADMIN — PIPERACILLIN AND TAZOBACTAM 3.38 G: 3; .375 INJECTION, POWDER, LYOPHILIZED, FOR SOLUTION INTRAVENOUS at 10:08

## 2021-07-10 NOTE — CONSULTS
[address]    Name:  Dorothea Strong  MR#:  [id number]  :  1955  ACCOUNT #:  [de-identified]  DATE OF SERVICE:  2021      HEMATOLOGY-ONCOLOGY CONSULT    REASON FOR ADMISSION:  Urosepsis. REASON FOR CONSULTATION:  Persistent leukocytosis. HISTORY OF PRESENT ILLNESS:  The patient is a very charming 56-year-old woman with a history of what sounds like bipolar disorder, Parkinson's disease, type 2 diabetes, who presented with confusion and poor p.o. intake. She was ultimately found to have 4/4 bottles positive for E. coli and urinary tract infection, and was treated appropriately. She was admitted approximately eight days ago. Despite her treatment, she has had minimal-to-no decrease in her white blood cell count, such that she is still white count of 21 today with composed predominantly of mature neutrophils. When she presented on 2021, she had 15% bands, but has no bands today. Platelet count is slightly elevated at 564, hemoglobin is low at 8.3, MCV normal at 83.8. She currently denies any sore throat, headache, chest pain, shortness of breath, abdominal pain, diarrhea, burning with urination, rash, red or swollen joints. She has no history of hematologic malignancy. PAST MEDICAL HISTORY:  1.  Left-sided breast cancer status post lumpectomy. She explains she was treated here, but on my review of her pathology specimen, she had excision of left lateral breast tissue on 10/05/2016 and 2016, both of which showed benign findings. 2.  Bipolar disorder. 3.  Hypertension. 4.  Type 2 diabetes. 5.  Status post hysterectomy. ALLERGIES:  NO KNOWN DRUG ALLERGIES. CURRENT MEDICATIONS IN THE HOSPITAL:  1. Lipitor 10 mg daily. 2.  Cogentin 0.5 mg p.o. twice daily. 3.  Heparin 5000 units subcu q.8 h.  4.  Lantus and Humalog insulin. 5.  Protonix 40 mg daily. 6.  Zosyn 3.375 g IV q.8.  7.  Risperdal 3 mg p.o. daily.   8.  Verapamil  mg p.o. daily. SOCIAL HISTORY:  She is . She previously worked for [employer]. She has one son. She is retired. I spoke with her son by phone, Mr. Cassandra Tellez. FAMILY HISTORY:  Mother and father  of old age. She has one sister with diabetes. REVIEW OF SYSTEMS:  GENERAL:  No fevers or chills. HEENT:  No auditory or visual change. LYMPHATIC:  No lumps or bumps in neck, underarm, or groin. CARDIOVASCULAR:  No chest pain or palpitations. PULMONARY:  No cough or shortness of breath. GASTROINTESTINAL:  No abdominal pain, diarrhea, or constipation. GENITOURINARY:  No dysuria or incontinence. SKIN:  No rash or changing mole. MUSCULOSKELETAL:  No red or swollen joints. NEUROLOGIC:  No irritability or syncope. OBJECTIVE:  VITAL SIGNS:  /94, pulse 98, temp 98.2, satting 97% on room air. GENERAL:  Pleasant, in no acute distress. HEENT:  Sclerae anicteric. Oropharynx clear. NECK:  Supple without lymphadenopathy or thyromegaly. HEART:  Regular rate and rhythm without murmur, rub, or gallop. LUNGS:  Clear to auscultation bilaterally. ABDOMEN:  Nontender and nondistended. Normoactive bowel sounds. No hepatosplenomegaly. EXTREMITIES:  No clubbing, cyanosis, or edema. PSYCHIATRIC:  She does have a somewhat flat affect, otherwise, in no acute distress. Alert and oriented x3. ASSESSMENT AND PLAN:  A 58-year-old woman with a history of bipolar disorder, admitted with sepsis, confusion, and cystitis, found to have Escherichia coli bacteremia, treated aggressively with IV antibiotics, but has had continued elevation of her white blood cell count composed primarily of mature neutrophils. I was asked to see for evaluation of leukocytosis. 1.  Leukocytosis:  Agree with concern for \"leukemia or myeloproliferative disorder. \"  She does not appear toxic today and has no complaint.   I have spoken with her son by phone, and I have recommended testing with flow cytometry, BCR-ABL, FISH, JAK2 with reflex, CLR and MPL and with a follow up in my office in two weeks' time. Son agrees to this approach and agrees to bring her to the office. Thank you for consult. I have not signed her out to the weekend rounding staff, but we are available if needed at [phone].       Qing Armstrong MD      [id number]  D:  07/09/2021 18:06  T:  07/09/2021 21:46  JOB #:  [id number]

## 2021-07-10 NOTE — PROGRESS NOTES
Transition of Care Plan   RUR- Low    DISPOSITION: Home Health liaison- Alva Joseph called [phone] from  [facility] to request information on when patient will dc, they have accepted patient.     pending medical progression   F/U with PCP/Specialist     Transport: CM will need to follow           Talia Aguiar  11:17 AM

## 2021-07-10 NOTE — PROGRESS NOTES
[facility] Adult  Hospitalist Group                                                                                          Hospitalist Progress Note  Geovani Vásquez MD  Answering service: [phone] OR 36 from in house phone        Date of Service:  7/10/2021  NAME:  Marcelle Shane  :  1955  MRN:  [id number]      Admission Summary:     Marcelle Shane is a 74 y.o. female with hx of NIDDM II, Dyslipidemia, Parkinson's, Mood disorder, PUD who presented to ed for confusion and poor po intake. Patient is seen and examined at bedside. She is alert and oriented to person and place but still mildly confused and provides limited history. States she has had persistent nausea over the last few days as well as lower abdominal pain. Per chart review, son reported that patient over the last 3 days has become increasingly confused. The patient denies any fever, chills, chest pain, cough, congestion, recent illness, palpitations, or dysuria.     Interval history / Subjective:     Pt has no complains, WBC scan - couldn't be dose due to access issues  Pt seems stable labs requested, ID - will f/u recommendation- WBC scan monady  Seen by heam/ onc labs send f/u out pt      Assessment & Plan:     Severe sepsis secondary to acute cystitis POA  -iv rocephin switched to IV zosyn on  and tutu Q, IVF discontinued  -no fever just low grade   -leukocytosis - repeat lab  -urine cx E Coli  -blood cx grow on  e coli, sensitive to ceftriaxone   -repeated blood cx  no growth so far  -CT abdomen pelvis on  small bilateral pleural effusions, mild pelvic fluid   -ID on board awaiting WBC scan   - f/u out pt with heam / onc in 2 weeks time    T2DM well controlled   -A1c 6.8  -continue lantus 15 units, sliding scale and monitor finger stick glucose   -home glimepiride on hold    AMANDEEP- resolved   -creatinine improving with IVF, Cr 1.14 from 2.81, discontinue IVF  -avoid nephrotoxin   -requested labs Anemia likely due to chronic disease   -stable, Hgb 8.7  -monitor H/H    Hypokalemia  -replaced and resolved    HTN  -BP not at goal, continue verapamil , monitor BP add second agent     Mood disorder   -stable, continue on cogentin and risperidone    Dyslipidemia   -stable, continue lipitor    Hx of PUD  -stable, continue protonix    Acute metabolic encephalopathy resolved aao x3     Hx of Parkinson's disease   -not on medication  -PT/OT    Code status: Full Code  DVT prophylaxis: heparin    Care Plan discussed with: Patient/Family, Nurse and   Anticipated Disposition: Home with [facility], PT/OT  Anticipated Discharge: Greater than 48 hours     Gentry Rosen [facility] Problems  Date Reviewed: 10/5/2016        Codes Class Noted POA    Sepsis ([facility] ICD-10-CM: A41.9  ICD-9-CM: 038.9, 995.91  6/30/2021 Unknown                 Vital Signs:    Last 24hrs VS reviewed since prior progress note. Most recent are:  Visit Vitals  BP (!) 156/87 (BP 1 Location: Right arm, BP Patient Position: At rest)   Pulse 88   Temp 98.8 °F (37.1 °C)   Resp 16   Ht 5' 3\" (1.6 m)   Wt 98.3 kg (216 lb 11.2 oz)   SpO2 97%   BMI 38.39 kg/m²       No intake or output data in the 24 hours ending 07/10/21 1044     Physical Examination:     I had a face to face encounter with this patient and independently examined them on 7/10/2021 as outlined below:          Constitutional:  No acute distress, cooperative, pleasant    ENT:  Oral mucosa moist, oropharynx benign. Resp:  CTA bilaterally. No wheezing/rhonchi/rales. No accessory muscle use   CV:  Regular rhythm, normal rate, no murmurs, gallops, rubs    GI:  Soft, non distended, non tender.  normoactive bowel sounds, no hepatosplenomegaly     Musculoskeletal:  No edema,     Neurologic:  Conscious and alert, well oriented, moves all extremities, CN II-XII grossly intact            Data Review:    Review and/or order of clinical lab test  Review and/or order of tests in the radiology section of CPT  Review and/or order of tests in the medicine section of CPT      Labs:     Recent Labs     07/09/21 1713 07/08/21 0239   WBC 21.2* 21.6*   HGB 8.3* 8.7*   HCT 25.3* 26.5*   * 521*     Recent Labs     07/09/21 1713 07/08/21 0239    141   K 4.0 3.9   * 109*   CO2 25 25   BUN 11 11   CREA 1.19* 1.12*   * 119*   CA 9.3 9.0     Recent Labs     07/08/21 0239   ALT 91*      TBILI 0.4   TP 6.1*   ALB 2.6*   GLOB 3.5     No results for input(s): INR, PTP, APTT, INREXT, INREXT in the last 72 hours. No results for input(s): FE, TIBC, PSAT, FERR in the last 72 hours. No results found for: FOL, RBCF   No results for input(s): PH, PCO2, PO2 in the last 72 hours. No results for input(s): CPK, CKNDX, TROIQ in the last 72 hours.     No lab exists for component: CPKMB  No results found for: CHOL, CHOLX, CHLST, CHOLV, HDL, HDLP, LDL, LDLC, DLDLP, TGLX, TRIGL, TRIGP, CHHD, CHHDX  Lab Results   Component Value Date/Time    Glucose (POC) 135 (H) 07/10/2021 07:12 AM    Glucose (POC) 182 (H) 07/09/2021 09:47 PM    Glucose (POC) 150 (H) 07/09/2021 04:24 PM    Glucose (POC) 177 (H) 07/09/2021 11:36 AM    Glucose (POC) 153 (H) 07/09/2021 06:16 AM     Lab Results   Component Value Date/Time    Color YELLOW/STRAW 06/30/2021 06:58 PM    Appearance CLOUDY (A) 06/30/2021 06:58 PM    Specific gravity 1.025 06/30/2021 06:58 PM    pH (UA) 5.5 06/30/2021 06:58 PM    Protein 100 (A) 06/30/2021 06:58 PM    Glucose Negative 06/30/2021 06:58 PM    Ketone TRACE (A) 06/30/2021 06:58 PM    Urobilinogen 1.0 06/30/2021 06:58 PM    Nitrites Negative 06/30/2021 06:58 PM    Leukocyte Esterase MODERATE (A) 06/30/2021 06:58 PM    Epithelial cells FEW 06/30/2021 06:58 PM    Bacteria 3+ (A) 06/30/2021 06:58 PM    WBC  06/30/2021 06:58 PM    RBC 0-5 06/30/2021 06:58 PM         Medications Reviewed:     Current Facility-Administered Medications   Medication Dose Route Frequency    piperacillin-tazobactam (ZOSYN) 3.375 g in 0.9% sodium chloride (MBP/ADV) 100 mL MBP  3.375 g IntraVENous Q8H    benztropine (COGENTIN) tablet 0.5 mg  0.5 mg Oral BID    risperiDONE (RisperDAL) tablet 3 mg  3 mg Oral DAILY    pantoprazole (PROTONIX) tablet 40 mg  40 mg Oral ACB    verapamil ER (CALAN-SR) tablet 240 mg  240 mg Oral DAILY WITH BREAKFAST    atorvastatin (LIPITOR) tablet 10 mg  10 mg Oral QHS    sodium chloride (NS) flush 5-40 mL  5-40 mL IntraVENous Q8H    sodium chloride (NS) flush 5-40 mL  5-40 mL IntraVENous PRN    acetaminophen (TYLENOL) tablet 650 mg  650 mg Oral Q6H PRN    Or    acetaminophen (TYLENOL) suppository 650 mg  650 mg Rectal Q6H PRN    polyethylene glycol (MIRALAX) packet 17 g  17 g Oral DAILY PRN    ondansetron (ZOFRAN ODT) tablet 4 mg  4 mg Oral Q8H PRN    Or    ondansetron (ZOFRAN) injection 4 mg  4 mg IntraVENous Q6H PRN    heparin (porcine) injection 5,000 Units  5,000 Units SubCUTAneous Q8H    insulin glargine (LANTUS) injection 15 Units  15 Units SubCUTAneous QHS    insulin lispro (HUMALOG) injection   SubCUTAneous AC&HS    glucose chewable tablet 16 g  4 Tablet Oral PRN    dextrose (D50W) injection syrg 12.5-25 g  12.5-25 g IntraVENous PRN    glucagon (GLUCAGEN) injection 1 mg  1 mg IntraMUSCular PRN    L.acidophilus-paracasei-S.thermophil-bifidobacter (RISAQUAD) 8 billion cell capsule  1 Capsule Oral DAILY     ______________________________________________________________________  EXPECTED LENGTH OF STAY: 4d 19h  ACTUAL LENGTH OF STAY:          10                 Aleisha Thompson MD

## 2021-07-11 LAB
ANION GAP SERPL CALC-SCNC: 5 MMOL/L (ref 5–15)
BASOPHILS # BLD: 0 K/UL (ref 0–0.1)
BASOPHILS NFR BLD: 0 % (ref 0–1)
BUN SERPL-MCNC: 7 MG/DL (ref 6–20)
BUN/CREAT SERPL: 7 (ref 12–20)
CALCIUM SERPL-MCNC: 9.9 MG/DL (ref 8.5–10.1)
CHLORIDE SERPL-SCNC: 108 MMOL/L (ref 97–108)
CO2 SERPL-SCNC: 28 MMOL/L (ref 21–32)
CREAT SERPL-MCNC: 1.01 MG/DL (ref 0.55–1.02)
DIFFERENTIAL METHOD BLD: ABNORMAL
EOSINOPHIL # BLD: 0 K/UL (ref 0–0.4)
EOSINOPHIL NFR BLD: 0 % (ref 0–7)
ERYTHROCYTE [DISTWIDTH] IN BLOOD BY AUTOMATED COUNT: 15.4 % (ref 11.5–14.5)
FLOW CYTOMETRY: NORMAL
GLUCOSE BLD STRIP.AUTO-MCNC: 124 MG/DL (ref 65–117)
GLUCOSE BLD STRIP.AUTO-MCNC: 146 MG/DL (ref 65–117)
GLUCOSE BLD STRIP.AUTO-MCNC: 160 MG/DL (ref 65–117)
GLUCOSE BLD STRIP.AUTO-MCNC: 176 MG/DL (ref 65–117)
GLUCOSE SERPL-MCNC: 168 MG/DL (ref 65–100)
HCT VFR BLD AUTO: 27.1 % (ref 35–47)
HGB BLD-MCNC: 8.6 G/DL (ref 11.5–16)
IMM GRANULOCYTES # BLD AUTO: 0.2 K/UL (ref 0–0.04)
IMM GRANULOCYTES NFR BLD AUTO: 1 % (ref 0–0.5)
LYMPHOCYTES # BLD: 1.6 K/UL (ref 0.8–3.5)
LYMPHOCYTES NFR BLD: 9 % (ref 12–49)
MCH RBC QN AUTO: 27.2 PG (ref 26–34)
MCHC RBC AUTO-ENTMCNC: 31.7 G/DL (ref 30–36.5)
MCV RBC AUTO: 85.8 FL (ref 80–99)
MONOCYTES # BLD: 0.7 K/UL (ref 0–1)
MONOCYTES NFR BLD: 4 % (ref 5–13)
NEUTS SEG # BLD: 15.5 K/UL (ref 1.8–8)
NEUTS SEG NFR BLD: 86 % (ref 32–75)
NRBC # BLD: 0 K/UL (ref 0–0.01)
NRBC BLD-RTO: 0 PER 100 WBC
PLATELET # BLD AUTO: 610 K/UL (ref 150–400)
PMV BLD AUTO: 9.1 FL (ref 8.9–12.9)
POTASSIUM SERPL-SCNC: 3.6 MMOL/L (ref 3.5–5.1)
RBC # BLD AUTO: 3.16 M/UL (ref 3.8–5.2)
RBC MORPH BLD: ABNORMAL
SERVICE CMNT-IMP: ABNORMAL
SODIUM SERPL-SCNC: 141 MMOL/L (ref 136–145)
WBC # BLD AUTO: 18 K/UL (ref 3.6–11)

## 2021-07-11 PROCEDURE — 85025 COMPLETE CBC W/AUTO DIFF WBC: CPT

## 2021-07-11 PROCEDURE — 65270000029 HC RM PRIVATE

## 2021-07-11 PROCEDURE — 74011636637 HC RX REV CODE- 636/637: Performed by: HOSPITALIST

## 2021-07-11 PROCEDURE — 74011250637 HC RX REV CODE- 250/637: Performed by: STUDENT IN AN ORGANIZED HEALTH CARE EDUCATION/TRAINING PROGRAM

## 2021-07-11 PROCEDURE — 74011250637 HC RX REV CODE- 250/637: Performed by: HOSPITALIST

## 2021-07-11 PROCEDURE — 80048 BASIC METABOLIC PNL TOTAL CA: CPT

## 2021-07-11 PROCEDURE — 36415 COLL VENOUS BLD VENIPUNCTURE: CPT

## 2021-07-11 PROCEDURE — 74011250636 HC RX REV CODE- 250/636: Performed by: STUDENT IN AN ORGANIZED HEALTH CARE EDUCATION/TRAINING PROGRAM

## 2021-07-11 PROCEDURE — 74011000258 HC RX REV CODE- 258: Performed by: INTERNAL MEDICINE

## 2021-07-11 PROCEDURE — 74011250636 HC RX REV CODE- 250/636: Performed by: INTERNAL MEDICINE

## 2021-07-11 PROCEDURE — 82962 GLUCOSE BLOOD TEST: CPT

## 2021-07-11 RX ADMIN — PANTOPRAZOLE SODIUM 40 MG: 40 TABLET, DELAYED RELEASE ORAL at 07:22

## 2021-07-11 RX ADMIN — Medication 10 ML: at 22:14

## 2021-07-11 RX ADMIN — HEPARIN SODIUM 5000 UNITS: 5000 INJECTION INTRAVENOUS; SUBCUTANEOUS at 12:25

## 2021-07-11 RX ADMIN — INSULIN LISPRO 2 UNITS: 100 INJECTION, SOLUTION INTRAVENOUS; SUBCUTANEOUS at 12:24

## 2021-07-11 RX ADMIN — INSULIN LISPRO 2 UNITS: 100 INJECTION, SOLUTION INTRAVENOUS; SUBCUTANEOUS at 18:31

## 2021-07-11 RX ADMIN — HEPARIN SODIUM 5000 UNITS: 5000 INJECTION INTRAVENOUS; SUBCUTANEOUS at 01:00

## 2021-07-11 RX ADMIN — BENZTROPINE MESYLATE 0.5 MG: 1 TABLET ORAL at 10:39

## 2021-07-11 RX ADMIN — HEPARIN SODIUM 5000 UNITS: 5000 INJECTION INTRAVENOUS; SUBCUTANEOUS at 18:31

## 2021-07-11 RX ADMIN — RISPERIDONE 3 MG: 1 TABLET ORAL at 10:39

## 2021-07-11 RX ADMIN — PIPERACILLIN AND TAZOBACTAM 3.38 G: 3; .375 INJECTION, POWDER, LYOPHILIZED, FOR SOLUTION INTRAVENOUS at 01:01

## 2021-07-11 RX ADMIN — ATORVASTATIN CALCIUM 10 MG: 10 TABLET, FILM COATED ORAL at 22:12

## 2021-07-11 RX ADMIN — BENZTROPINE MESYLATE 0.5 MG: 1 TABLET ORAL at 19:28

## 2021-07-11 RX ADMIN — VERAPAMIL HYDROCHLORIDE 240 MG: 120 TABLET, FILM COATED, EXTENDED RELEASE ORAL at 07:22

## 2021-07-11 RX ADMIN — INSULIN GLARGINE 15 UNITS: 100 INJECTION, SOLUTION SUBCUTANEOUS at 22:13

## 2021-07-11 RX ADMIN — Medication 1 CAPSULE: at 10:39

## 2021-07-11 NOTE — PROGRESS NOTES
[facility] Adult  Hospitalist Group                                                                                          Hospitalist Progress Note  Manny Chase MD  Answering service: [phone] from in house phone        Date of Service:  2021  NAME:  Anila James  :  1955  MRN:  [id number]      Admission Summary:     Anila James is a 74 y.o. female with hx of NIDDM II, Dyslipidemia, Parkinson's, Mood disorder, PUD who presented to ed for confusion and poor po intake. Patient is seen and examined at bedside. She is alert and oriented to person and place but still mildly confused and provides limited history. States she has had persistent nausea over the last few days as well as lower abdominal pain. Per chart review, son reported that patient over the last 3 days has become increasingly confused. The patient denies any fever, chills, chest pain, cough, congestion, recent illness, palpitations, or dysuria.     Interval history / Subjective:     Pt has no complains, WBC scan - couldn't be dose due to access issues repeat Monday  Requested labs today  Will d/c with HH / kathiw son yesterday updated     Seen by he[facility]en/ onc labs send f/u out pt      Assessment & Plan:     Severe sepsis secondary to acute cystitis POA  -iv rocephin switched to IV zosyn on  and tutu Q, IVF discontinued  -no fever just low grade   -leukocytosis - repeat lab  -urine cx E Coli  -blood cx grow on  e coli, sensitive to ceftriaxone   -repeated blood cx  no growth so far  -CT abdomen pelvis on  small bilateral pleural effusions, mild pelvic fluid   -ID on board awaiting WBC scan - Monday scheduled   - f/u out pt with heam / onc in 2 weeks time    T2DM well controlled   -A1c 6.8  -continue lantus 15 units, sliding scale and monitor finger stick glucose   -home glimepiride on hold    AMANDEEP- resolved   -creatinine improving with IVF, Cr 1.14 from 2.81, discontinue IVF  -avoid nephrotoxin -requested labs     Anemia likely due to chronic disease   -stable, Hgb 8.7  -monitor H/H    Hypokalemia  -replaced and resolved    HTN  -BP not at goal, continue verapamil , monitor BP add second agent     Mood disorder   -stable, continue on cogentin and risperidone    Dyslipidemia   -stable, continue lipitor    Hx of PUD  -stable, continue protonix    Acute metabolic encephalopathy resolved aao x3     Hx of Parkinson's disease   -not on medication  -PT/OT    Code status: Full Code  DVT prophylaxis: heparin    Care Plan discussed with: Patient/Family, Nurse and   Anticipated Disposition: Home with [facility], PT/OT  Anticipated Discharge: Greater than 48 hours     [address] Problems  Date Reviewed: 10/5/2016        Codes Class Noted POA    Sepsis ([facility] [address] ICD-10-CM: A41.9  ICD-9-CM: 038.9, 995.91  6/30/2021 Unknown                 Vital Signs:    Last 24hrs VS reviewed since prior progress note. Most recent are:  Visit Vitals  BP (!) 147/83 (BP 1 Location: Left upper arm, BP Patient Position: At rest)   Pulse 80   Temp 98.8 °F (37.1 °C)   Resp 16   Ht 5' 3\" (1.6 m)   Wt 98.3 kg (216 lb 11.2 oz)   SpO2 96%   BMI 38.39 kg/m²       No intake or output data in the 24 hours ending 07/11/21 0844     Physical Examination:     I had a face to face encounter with this patient and independently examined them on 7/11/2021 as outlined below:          Constitutional:  No acute distress, cooperative, pleasant    ENT:  Oral mucosa moist, oropharynx benign. Resp:  CTA bilaterally. No wheezing/rhonchi/rales. No accessory muscle use   CV:  Regular rhythm, normal rate, no murmurs, gallops, rubs    GI:  Soft, non distended, non tender.  normoactive bowel sounds, no hepatosplenomegaly     Musculoskeletal:  No edema,     Neurologic:  Conscious and alert, well oriented, moves all extremities, CN II-XII grossly intact            Data Review:    Review and/or order of clinical lab test  Review and/or order of tests in the radiology section of CPT  Review and/or order of tests in the medicine section of CPT      Labs:     Recent Labs     07/09/21  1713   WBC 21.2*   HGB 8.3*   HCT 25.3*   *     Recent Labs     07/09/21  1713      K 4.0   *   CO2 25   BUN 11   CREA 1.19*   *   CA 9.3     No results for input(s): ALT, AP, TBIL, TBILI, TP, ALB, GLOB, GGT, AML, LPSE in the last 72 hours. No lab exists for component: SGOT, GPT, AMYP, HLPSE  No results for input(s): INR, PTP, APTT, INREXT, INREXT in the last 72 hours. No results for input(s): FE, TIBC, PSAT, FERR in the last 72 hours. No results found for: FOL, RBCF   No results for input(s): PH, PCO2, PO2 in the last 72 hours. No results for input(s): CPK, CKNDX, TROIQ in the last 72 hours.     No lab exists for component: CPKMB  No results found for: CHOL, CHOLX, CHLST, CHOLV, HDL, HDLP, LDL, LDLC, DLDLP, TGLX, TRIGL, TRIGP, CHHD, CHHDX  Lab Results   Component Value Date/Time    Glucose (POC) 124 (H) 07/11/2021 07:17 AM    Glucose (POC) 155 (H) 07/10/2021 08:58 PM    Glucose (POC) 128 (H) 07/10/2021 04:16 PM    Glucose (POC) 177 (H) 07/10/2021 11:10 AM    Glucose (POC) 135 (H) 07/10/2021 07:12 AM     Lab Results   Component Value Date/Time    Color YELLOW/STRAW 06/30/2021 06:58 PM    Appearance CLOUDY (A) 06/30/2021 06:58 PM    Specific gravity 1.025 06/30/2021 06:58 PM    pH (UA) 5.5 06/30/2021 06:58 PM    Protein 100 (A) 06/30/2021 06:58 PM    Glucose Negative 06/30/2021 06:58 PM    Ketone TRACE (A) 06/30/2021 06:58 PM    Urobilinogen 1.0 06/30/2021 06:58 PM    Nitrites Negative 06/30/2021 06:58 PM    Leukocyte Esterase MODERATE (A) 06/30/2021 06:58 PM    Epithelial cells FEW 06/30/2021 06:58 PM    Bacteria 3+ (A) 06/30/2021 06:58 PM    WBC  06/30/2021 06:58 PM    RBC 0-5 06/30/2021 06:58 PM         Medications Reviewed:     Current Facility-Administered Medications   Medication Dose Route Frequency    piperacillin-tazobactam (ZOSYN) 3.375 g in 0.9% sodium chloride (MBP/ADV) 100 mL MBP  3.375 g IntraVENous Q8H    benztropine (COGENTIN) tablet 0.5 mg  0.5 mg Oral BID    risperiDONE (RisperDAL) tablet 3 mg  3 mg Oral DAILY    pantoprazole (PROTONIX) tablet 40 mg  40 mg Oral ACB    verapamil ER (CALAN-SR) tablet 240 mg  240 mg Oral DAILY WITH BREAKFAST    atorvastatin (LIPITOR) tablet 10 mg  10 mg Oral QHS    sodium chloride (NS) flush 5-40 mL  5-40 mL IntraVENous Q8H    sodium chloride (NS) flush 5-40 mL  5-40 mL IntraVENous PRN    acetaminophen (TYLENOL) tablet 650 mg  650 mg Oral Q6H PRN    Or    acetaminophen (TYLENOL) suppository 650 mg  650 mg Rectal Q6H PRN    polyethylene glycol (MIRALAX) packet 17 g  17 g Oral DAILY PRN    ondansetron (ZOFRAN ODT) tablet 4 mg  4 mg Oral Q8H PRN    Or    ondansetron (ZOFRAN) injection 4 mg  4 mg IntraVENous Q6H PRN    heparin (porcine) injection 5,000 Units  5,000 Units SubCUTAneous Q8H    insulin glargine (LANTUS) injection 15 Units  15 Units SubCUTAneous QHS    insulin lispro (HUMALOG) injection   SubCUTAneous AC&HS    glucose chewable tablet 16 g  4 Tablet Oral PRN    dextrose (D50W) injection syrg 12.5-25 g  12.5-25 g IntraVENous PRN    glucagon (GLUCAGEN) injection 1 mg  1 mg IntraMUSCular PRN    L.acidophilus-paracasei-S.thermophil-bifidobacter (RISAQUAD) 8 billion cell capsule  1 Capsule Oral DAILY     ______________________________________________________________________  EXPECTED LENGTH OF STAY: 4d 19h  ACTUAL LENGTH OF STAY:          Suresh Lou MD

## 2021-07-11 NOTE — PROGRESS NOTES
Pt refusing both Zosyn and heparin. RN attempted to educate pt on the importance of both medications. Reinforcement needed. Pt still refused both medications and was more agreeable to taking them while asleep. Will notify night shift.

## 2021-07-11 NOTE — PROGRESS NOTES
Pt is refusing 0900 Zosyn dose. RN attempted to provide education on the importance of this medication. Pt still refused med after education. MD has been notified. Will attempt again later.

## 2021-07-12 ENCOUNTER — APPOINTMENT (OUTPATIENT)
Dept: NUCLEAR MEDICINE | Age: 66
DRG: 871 | End: 2021-07-12
Attending: INTERNAL MEDICINE
Payer: MEDICARE

## 2021-07-12 ENCOUNTER — HOSPITAL ENCOUNTER (OUTPATIENT)
Dept: NUCLEAR MEDICINE | Age: 66
Discharge: HOME | DRG: 871 | End: 2021-07-12
Attending: INTERNAL MEDICINE | Admitting: FAMILY MEDICINE
Payer: MEDICARE

## 2021-07-12 LAB
ANION GAP SERPL CALC-SCNC: 7 MMOL/L (ref 5–15)
BASOPHILS # BLD: 0.1 K/UL (ref 0–0.1)
BASOPHILS NFR BLD: 0 % (ref 0–1)
BUN SERPL-MCNC: 10 MG/DL (ref 6–20)
BUN/CREAT SERPL: 9 (ref 12–20)
CALCIUM SERPL-MCNC: 9.5 MG/DL (ref 8.5–10.1)
CHLORIDE SERPL-SCNC: 108 MMOL/L (ref 97–108)
CO2 SERPL-SCNC: 25 MMOL/L (ref 21–32)
CREAT SERPL-MCNC: 1.06 MG/DL (ref 0.55–1.02)
DIFFERENTIAL METHOD BLD: ABNORMAL
EOSINOPHIL # BLD: 0.1 K/UL (ref 0–0.4)
EOSINOPHIL NFR BLD: 0 % (ref 0–7)
ERYTHROCYTE [DISTWIDTH] IN BLOOD BY AUTOMATED COUNT: 15.6 % (ref 11.5–14.5)
GLUCOSE BLD STRIP.AUTO-MCNC: 144 MG/DL (ref 65–117)
GLUCOSE BLD STRIP.AUTO-MCNC: 146 MG/DL (ref 65–117)
GLUCOSE BLD STRIP.AUTO-MCNC: 177 MG/DL (ref 65–117)
GLUCOSE BLD STRIP.AUTO-MCNC: 183 MG/DL (ref 65–117)
GLUCOSE SERPL-MCNC: 180 MG/DL (ref 65–100)
HCT VFR BLD AUTO: 25.3 % (ref 35–47)
HGB BLD-MCNC: 7.9 G/DL (ref 11.5–16)
IMM GRANULOCYTES # BLD AUTO: 0.1 K/UL (ref 0–0.04)
IMM GRANULOCYTES NFR BLD AUTO: 1 % (ref 0–0.5)
LYMPHOCYTES # BLD: 2.1 K/UL (ref 0.8–3.5)
LYMPHOCYTES NFR BLD: 12 % (ref 12–49)
MCH RBC QN AUTO: 26.9 PG (ref 26–34)
MCHC RBC AUTO-ENTMCNC: 31.2 G/DL (ref 30–36.5)
MCV RBC AUTO: 86.1 FL (ref 80–99)
MONOCYTES # BLD: 1 K/UL (ref 0–1)
MONOCYTES NFR BLD: 6 % (ref 5–13)
NEUTS SEG # BLD: 13.6 K/UL (ref 1.8–8)
NEUTS SEG NFR BLD: 81 % (ref 32–75)
NRBC # BLD: 0 K/UL (ref 0–0.01)
NRBC BLD-RTO: 0 PER 100 WBC
PLATELET # BLD AUTO: 563 K/UL (ref 150–400)
PMV BLD AUTO: 9.3 FL (ref 8.9–12.9)
POTASSIUM SERPL-SCNC: 3.4 MMOL/L (ref 3.5–5.1)
RBC # BLD AUTO: 2.94 M/UL (ref 3.8–5.2)
SERVICE CMNT-IMP: ABNORMAL
SODIUM SERPL-SCNC: 140 MMOL/L (ref 136–145)
WBC # BLD AUTO: 17 K/UL (ref 3.6–11)

## 2021-07-12 PROCEDURE — 85025 COMPLETE CBC W/AUTO DIFF WBC: CPT

## 2021-07-12 PROCEDURE — 74011000258 HC RX REV CODE- 258: Performed by: INTERNAL MEDICINE

## 2021-07-12 PROCEDURE — 77030020847 HC STATLOK BARD -A

## 2021-07-12 PROCEDURE — 65270000029 HC RM PRIVATE

## 2021-07-12 PROCEDURE — A9569 TECHNETIUM TC-99M AUTO WBC: HCPCS

## 2021-07-12 PROCEDURE — 82962 GLUCOSE BLOOD TEST: CPT

## 2021-07-12 PROCEDURE — 74011250636 HC RX REV CODE- 250/636: Performed by: INTERNAL MEDICINE

## 2021-07-12 PROCEDURE — 74011250637 HC RX REV CODE- 250/637: Performed by: STUDENT IN AN ORGANIZED HEALTH CARE EDUCATION/TRAINING PROGRAM

## 2021-07-12 PROCEDURE — 74011250637 HC RX REV CODE- 250/637: Performed by: HOSPITALIST

## 2021-07-12 PROCEDURE — 36415 COLL VENOUS BLD VENIPUNCTURE: CPT

## 2021-07-12 PROCEDURE — 74011636637 HC RX REV CODE- 636/637: Performed by: HOSPITALIST

## 2021-07-12 PROCEDURE — 74011250636 HC RX REV CODE- 250/636

## 2021-07-12 PROCEDURE — 74011250636 HC RX REV CODE- 250/636: Performed by: STUDENT IN AN ORGANIZED HEALTH CARE EDUCATION/TRAINING PROGRAM

## 2021-07-12 PROCEDURE — 80048 BASIC METABOLIC PNL TOTAL CA: CPT

## 2021-07-12 RX ORDER — HEPARIN SODIUM 1000 [USP'U]/ML
10000 INJECTION, SOLUTION INTRAVENOUS; SUBCUTANEOUS
Status: COMPLETED | OUTPATIENT
Start: 2021-07-12 | End: 2021-07-12

## 2021-07-12 RX ORDER — HEPARIN SODIUM 1000 [USP'U]/ML
INJECTION, SOLUTION INTRAVENOUS; SUBCUTANEOUS
Status: COMPLETED
Start: 2021-07-12 | End: 2021-07-12

## 2021-07-12 RX ADMIN — ATORVASTATIN CALCIUM 10 MG: 10 TABLET, FILM COATED ORAL at 22:45

## 2021-07-12 RX ADMIN — POTASSIUM BICARBONATE 40 MEQ: 782 TABLET, EFFERVESCENT ORAL at 11:04

## 2021-07-12 RX ADMIN — INSULIN LISPRO 2 UNITS: 100 INJECTION, SOLUTION INTRAVENOUS; SUBCUTANEOUS at 17:42

## 2021-07-12 RX ADMIN — HEPARIN SODIUM 5000 UNITS: 5000 INJECTION INTRAVENOUS; SUBCUTANEOUS at 18:51

## 2021-07-12 RX ADMIN — POTASSIUM BICARBONATE 40 MEQ: 782 TABLET, EFFERVESCENT ORAL at 17:40

## 2021-07-12 RX ADMIN — INSULIN LISPRO 2 UNITS: 100 INJECTION, SOLUTION INTRAVENOUS; SUBCUTANEOUS at 07:21

## 2021-07-12 RX ADMIN — PANTOPRAZOLE SODIUM 40 MG: 40 TABLET, DELAYED RELEASE ORAL at 07:21

## 2021-07-12 RX ADMIN — HEPARIN SODIUM 2000 UNITS: 1000 INJECTION, SOLUTION INTRAVENOUS; SUBCUTANEOUS at 09:01

## 2021-07-12 RX ADMIN — PIPERACILLIN AND TAZOBACTAM 3.38 G: 3; .375 INJECTION, POWDER, LYOPHILIZED, FOR SOLUTION INTRAVENOUS at 09:21

## 2021-07-12 RX ADMIN — INSULIN LISPRO 2 UNITS: 100 INJECTION, SOLUTION INTRAVENOUS; SUBCUTANEOUS at 11:04

## 2021-07-12 RX ADMIN — INSULIN GLARGINE 15 UNITS: 100 INJECTION, SOLUTION SUBCUTANEOUS at 22:45

## 2021-07-12 RX ADMIN — Medication 10 ML: at 17:41

## 2021-07-12 RX ADMIN — Medication 1 CAPSULE: at 09:21

## 2021-07-12 RX ADMIN — RISPERIDONE 3 MG: 1 TABLET ORAL at 09:21

## 2021-07-12 RX ADMIN — HEPARIN SODIUM 5000 UNITS: 5000 INJECTION INTRAVENOUS; SUBCUTANEOUS at 01:03

## 2021-07-12 RX ADMIN — BENZTROPINE MESYLATE 0.5 MG: 1 TABLET ORAL at 17:40

## 2021-07-12 RX ADMIN — HEPARIN SODIUM 5000 UNITS: 5000 INJECTION INTRAVENOUS; SUBCUTANEOUS at 11:04

## 2021-07-12 RX ADMIN — PIPERACILLIN AND TAZOBACTAM 3.38 G: 3; .375 INJECTION, POWDER, LYOPHILIZED, FOR SOLUTION INTRAVENOUS at 01:12

## 2021-07-12 RX ADMIN — VERAPAMIL HYDROCHLORIDE 240 MG: 120 TABLET, FILM COATED, EXTENDED RELEASE ORAL at 07:20

## 2021-07-12 RX ADMIN — Medication 10 ML: at 22:45

## 2021-07-12 RX ADMIN — HEPARIN SODIUM 2000 UNITS: 1000 INJECTION INTRAVENOUS; SUBCUTANEOUS at 09:01

## 2021-07-12 RX ADMIN — BENZTROPINE MESYLATE 0.5 MG: 1 TABLET ORAL at 09:21

## 2021-07-12 NOTE — PROGRESS NOTES
[facility] [facility] Adult  Hospitalist Group                                                                                          Hospitalist Progress Note  Margaret Ríos MD  Answering service: [phone] from in house phone        Date of Service:  2021  NAME:  Naldo Douglas  :  1955  MRN:  [id number]      Admission Summary:     Naldo Douglas is a 74 y.o. female with hx of NIDDM II, Dyslipidemia, Parkinson's, Mood disorder, PUD who presented to ed for confusion and poor po intake. Patient is seen and examined at bedside. She is alert and oriented to person and place but still mildly confused and provides limited history. States she has had persistent nausea over the last few days as well as lower abdominal pain. Per chart review, son reported that patient over the last 3 days has become increasingly confused. The patient denies any fever, chills, chest pain, cough, congestion, recent illness, palpitations, or dysuria.     Interval history / Subjective:     Pt has no complains, WBC scan - couldn't be dose due to access issues   Repeat scan today Monday   d/c with [facility] / [email or web address] son yesterday updated     Seen by heam/ onc labs send f/u out pt      Assessment & Plan:     Severe sepsis secondary to acute cystitis POA  -iv rocephin switched to IV zosyn on  and tutu Q, IVF discontinued ( pt refusing IV abx time to time )   -no fever   -leukocytosis - repeat lab- persisting   -urine cx E Coli  -blood cx grow on  e coli, sensitive to ceftriaxone   -repeated blood cx  no growth so far  -CT abdomen pelvis on  small bilateral pleural effusions, mild pelvic fluid   -ID on board awaiting WBC scan - Monday scheduled   - f/u out pt with heam / onc in 2 weeks time    T2DM well controlled   -A1c 6.8  -continue lantus 15 units, sliding scale and monitor finger stick glucose   -home glimepiride on hold    AMANDEEP- resolved   -creatinine improving with IVF, Cr 1.14 from 2.81, discontinue IVF  -avoid nephrotoxin   -requested labs     Anemia likely due to chronic disease   -stable, Hgb 8.7  -monitor H/H    Hypokalemia  -replaced and resolved    HTN  -BP not at goal, continue verapamil , monitor BP add second agent     Mood disorder   -stable, continue on cogentin and risperidone    Dyslipidemia   -stable, continue lipitor    Hx of PUD  -stable, continue protonix    Acute metabolic encephalopathy resolved aao x3     Hx of Parkinson's disease   -not on medication  -PT/OT    Code status: Full Code  DVT prophylaxis: heparin    Care Plan discussed with: Patient/Family, Nurse and   Anticipated Disposition: Home with [facility], PT/OT  Anticipated Discharge: In 24 hrs     Bartolo Escalante [address] [facility] Problems  Date Reviewed: 10/5/2016        Codes Class Noted POA    Sepsis [facility]) ICD-10-CM: A41.9  ICD-9-CM: 038.9, 995.91  6/30/2021 Unknown                 Vital Signs:    Last 24hrs VS reviewed since prior progress note. Most recent are:  Visit Vitals  BP (!) 172/95 (BP 1 Location: Right upper arm, BP Patient Position: At rest;Sitting)   Pulse 94   Temp 98.2 °F (36.8 °C)   Resp 18   Ht 5' 3\" (1.6 m)   Wt 98.3 kg (216 lb 11.2 oz)   SpO2 98%   BMI 38.39 kg/m²       No intake or output data in the 24 hours ending 07/12/21 0932     Physical Examination:     I had a face to face encounter with this patient and independently examined them on 7/12/2021 as outlined below:          Constitutional:  No acute distress,     ENT:  Oral mucosa moist,   Resp:  CTA bilaterally. CV:  Regular rhythm, normal rate,     GI:  Soft, non distended, non tender.  bs+    Musculoskeletal:  No edema,     Neurologic:  Conscious and alert, well oriented,            Data Review:    Review and/or order of clinical lab test  Review and/or order of tests in the radiology section of CPT  Review and/or order of tests in the medicine section of CPT      Labs:     Recent Labs     07/12/21  0111 07/11/21  1046   WBC 17.0* 18.0*   HGB 7.9* 8.6*   HCT 25.3* 27.1*   * 610*     Recent Labs     07/12/21  0111 07/11/21  1046 07/09/21  1713    141 142   K 3.4* 3.6 4.0    108 110*   CO2 25 28 25   BUN 10 7 11   CREA 1.06* 1.01 1.19*   * 168* 114*   CA 9.5 9.9 9.3     No results for input(s): ALT, AP, TBIL, TBILI, TP, ALB, GLOB, GGT, AML, LPSE in the last 72 hours. No lab exists for component: SGOT, GPT, AMYP, HLPSE  No results for input(s): INR, PTP, APTT, INREXT, INREXT in the last 72 hours. No results for input(s): FE, TIBC, PSAT, FERR in the last 72 hours. No results found for: FOL, RBCF   No results for input(s): PH, PCO2, PO2 in the last 72 hours. No results for input(s): CPK, CKNDX, TROIQ in the last 72 hours.     No lab exists for component: CPKMB  No results found for: CHOL, CHOLX, CHLST, CHOLV, HDL, HDLP, LDL, LDLC, DLDLP, TGLX, TRIGL, TRIGP, CHHD, CHHDX  Lab Results   Component Value Date/Time    Glucose (POC) 144 (H) 07/12/2021 06:41 AM    Glucose (POC) 160 (H) 07/11/2021 10:08 PM    Glucose (POC) 176 (H) 07/11/2021 04:13 PM    Glucose (POC) 146 (H) 07/11/2021 11:44 AM    Glucose (POC) 124 (H) 07/11/2021 07:17 AM     Lab Results   Component Value Date/Time    Color YELLOW/STRAW 06/30/2021 06:58 PM    Appearance CLOUDY (A) 06/30/2021 06:58 PM    Specific gravity 1.025 06/30/2021 06:58 PM    pH (UA) 5.5 06/30/2021 06:58 PM    Protein 100 (A) 06/30/2021 06:58 PM    Glucose Negative 06/30/2021 06:58 PM    Ketone TRACE (A) 06/30/2021 06:58 PM    Urobilinogen 1.0 06/30/2021 06:58 PM    Nitrites Negative 06/30/2021 06:58 PM    Leukocyte Esterase MODERATE (A) 06/30/2021 06:58 PM    Epithelial cells FEW 06/30/2021 06:58 PM    Bacteria 3+ (A) 06/30/2021 06:58 PM    WBC  06/30/2021 06:58 PM    RBC 0-5 06/30/2021 06:58 PM         Medications Reviewed:     Current Facility-Administered Medications   Medication Dose Route Frequency    heparin (porcine) 1,000 unit/mL injection        heparin (porcine) 1,000 unit/mL injection 10,000 Units  10,000 Units Other RAD ONCE    piperacillin-tazobactam (ZOSYN) 3.375 g in 0.9% sodium chloride (MBP/ADV) 100 mL MBP  3.375 g IntraVENous Q8H    benztropine (COGENTIN) tablet 0.5 mg  0.5 mg Oral BID    risperiDONE (RisperDAL) tablet 3 mg  3 mg Oral DAILY    pantoprazole (PROTONIX) tablet 40 mg  40 mg Oral ACB    verapamil ER (CALAN-SR) tablet 240 mg  240 mg Oral DAILY WITH BREAKFAST    atorvastatin (LIPITOR) tablet 10 mg  10 mg Oral QHS    sodium chloride (NS) flush 5-40 mL  5-40 mL IntraVENous Q8H    sodium chloride (NS) flush 5-40 mL  5-40 mL IntraVENous PRN    acetaminophen (TYLENOL) tablet 650 mg  650 mg Oral Q6H PRN    Or    acetaminophen (TYLENOL) suppository 650 mg  650 mg Rectal Q6H PRN    polyethylene glycol (MIRALAX) packet 17 g  17 g Oral DAILY PRN    ondansetron (ZOFRAN ODT) tablet 4 mg  4 mg Oral Q8H PRN    Or    ondansetron (ZOFRAN) injection 4 mg  4 mg IntraVENous Q6H PRN    heparin (porcine) injection 5,000 Units  5,000 Units SubCUTAneous Q8H    insulin glargine (LANTUS) injection 15 Units  15 Units SubCUTAneous QHS    insulin lispro (HUMALOG) injection   SubCUTAneous AC&HS    glucose chewable tablet 16 g  4 Tablet Oral PRN    dextrose (D50W) injection syrg 12.5-25 g  12.5-25 g IntraVENous PRN    glucagon (GLUCAGEN) injection 1 mg  1 mg IntraMUSCular PRN    L.acidophilus-paracasei-S.thermophil-bifidobacter (RISAQUAD) 8 billion cell capsule  1 Capsule Oral DAILY     ______________________________________________________________________  EXPECTED LENGTH OF STAY: 4d 19h  ACTUAL LENGTH OF STAY:          12                 Lou Hunt MD

## 2021-07-12 NOTE — PROGRESS NOTES
Spiritual Care Assessment/Progress Note  [facility]      NAME: Sims Boxer      MRN: [id number]  AGE: 74 y.o. SEX: female  Baptism Affiliation: Yazdanism   Language: English     7/12/2021     Total Time (in minutes): 10     Spiritual Assessment begun in [facility] through conversation with:         [x]Patient        [] Family    [] Friend(s)        Reason for Consult: Initial/Spiritual assessment, patient floor     Spiritual beliefs: (Please include comment if needed)     [] Identifies with a trevor tradition:         [] Supported by a trevor community:            [] Claims no spiritual orientation:           [] Seeking spiritual identity:                [] Adheres to an individual form of spirituality:           [x] Not able to assess:                           Identified resources for coping:      [] Prayer                               [] Music                  [] Guided Imagery     [] Family/friends                 [] Pet visits     [] Devotional reading                         [x] Unknown     [] Other:                                               Interventions offered during this visit: (See comments for more details)                Plan of Care:     [] Support spiritual and/or cultural needs    [] Support AMD and/or advance care planning process      [] Support grieving process   [] Coordinate Rites and/or Rituals    [] Coordination with community clergy   [] No spiritual needs identified at this time   [] Detailed Plan of Care below (See Comments)  [] Make referral to Music Therapy  [] Make referral to Pet Therapy     [] Make referral to Addiction services  [] Make referral to [facility]  [] Make referral to Spiritual Care Partner  [] No future visits requested        [x] Follow up upon further referrals     Comments:  for initial visit. Pt was unavailable at this time. Please contact [phone] Vick Parker for further support.      Chaplain Zully Petersen M.Div, MACE   [phone])

## 2021-07-12 NOTE — PROGRESS NOTES
Physical Therapy  Attempted to work with patient but she refused to participate. Will follow up later today or tomorrow.   He Lynne, PT

## 2021-07-13 VITALS
DIASTOLIC BLOOD PRESSURE: 80 MMHG | WEIGHT: 216.7 LBS | HEART RATE: 87 BPM | BODY MASS INDEX: 38.39 KG/M2 | HEIGHT: 63 IN | RESPIRATION RATE: 18 BRPM | OXYGEN SATURATION: 99 % | SYSTOLIC BLOOD PRESSURE: 160 MMHG | TEMPERATURE: 98.7 F

## 2021-07-13 PROBLEM — A41.9 SEPSIS (HCC): Status: RESOLVED | Noted: 2021-06-30 | Resolved: 2021-07-13

## 2021-07-13 LAB
ANION GAP SERPL CALC-SCNC: 7 MMOL/L (ref 5–15)
BASOPHILS # BLD: 0.1 K/UL (ref 0–0.1)
BASOPHILS NFR BLD: 1 % (ref 0–1)
BUN SERPL-MCNC: 9 MG/DL (ref 6–20)
BUN/CREAT SERPL: 9 (ref 12–20)
CALCIUM SERPL-MCNC: 9.6 MG/DL (ref 8.5–10.1)
CHLORIDE SERPL-SCNC: 108 MMOL/L (ref 97–108)
CO2 SERPL-SCNC: 26 MMOL/L (ref 21–32)
CREAT SERPL-MCNC: 0.96 MG/DL (ref 0.55–1.02)
DIFFERENTIAL METHOD BLD: ABNORMAL
EOSINOPHIL # BLD: 0.1 K/UL (ref 0–0.4)
EOSINOPHIL NFR BLD: 0 % (ref 0–7)
ERYTHROCYTE [DISTWIDTH] IN BLOOD BY AUTOMATED COUNT: 15.5 % (ref 11.5–14.5)
GLUCOSE BLD STRIP.AUTO-MCNC: 127 MG/DL (ref 65–117)
GLUCOSE BLD STRIP.AUTO-MCNC: 197 MG/DL (ref 65–117)
GLUCOSE SERPL-MCNC: 119 MG/DL (ref 65–100)
HCT VFR BLD AUTO: 24.8 % (ref 35–47)
HGB BLD-MCNC: 7.9 G/DL (ref 11.5–16)
IMM GRANULOCYTES # BLD AUTO: 0.1 K/UL (ref 0–0.04)
IMM GRANULOCYTES NFR BLD AUTO: 1 % (ref 0–0.5)
LYMPHOCYTES # BLD: 1.8 K/UL (ref 0.8–3.5)
LYMPHOCYTES NFR BLD: 13 % (ref 12–49)
MCH RBC QN AUTO: 27.2 PG (ref 26–34)
MCHC RBC AUTO-ENTMCNC: 31.9 G/DL (ref 30–36.5)
MCV RBC AUTO: 85.5 FL (ref 80–99)
MONOCYTES # BLD: 1 K/UL (ref 0–1)
MONOCYTES NFR BLD: 8 % (ref 5–13)
NEUTS SEG # BLD: 10.4 K/UL (ref 1.8–8)
NEUTS SEG NFR BLD: 77 % (ref 32–75)
NRBC # BLD: 0 K/UL (ref 0–0.01)
NRBC BLD-RTO: 0 PER 100 WBC
PLATELET # BLD AUTO: 519 K/UL (ref 150–400)
PMV BLD AUTO: 9.4 FL (ref 8.9–12.9)
POTASSIUM SERPL-SCNC: 4.4 MMOL/L (ref 3.5–5.1)
RBC # BLD AUTO: 2.9 M/UL (ref 3.8–5.2)
SERVICE CMNT-IMP: ABNORMAL
SERVICE CMNT-IMP: ABNORMAL
SODIUM SERPL-SCNC: 141 MMOL/L (ref 136–145)
WBC # BLD AUTO: 13.4 K/UL (ref 3.6–11)

## 2021-07-13 PROCEDURE — 97116 GAIT TRAINING THERAPY: CPT

## 2021-07-13 PROCEDURE — 74011250637 HC RX REV CODE- 250/637: Performed by: HOSPITALIST

## 2021-07-13 PROCEDURE — 80048 BASIC METABOLIC PNL TOTAL CA: CPT

## 2021-07-13 PROCEDURE — 85025 COMPLETE CBC W/AUTO DIFF WBC: CPT

## 2021-07-13 PROCEDURE — 74011250636 HC RX REV CODE- 250/636: Performed by: STUDENT IN AN ORGANIZED HEALTH CARE EDUCATION/TRAINING PROGRAM

## 2021-07-13 PROCEDURE — 74011636637 HC RX REV CODE- 636/637: Performed by: HOSPITALIST

## 2021-07-13 PROCEDURE — 82962 GLUCOSE BLOOD TEST: CPT

## 2021-07-13 PROCEDURE — 36415 COLL VENOUS BLD VENIPUNCTURE: CPT

## 2021-07-13 RX ADMIN — BENZTROPINE MESYLATE 0.5 MG: 1 TABLET ORAL at 08:51

## 2021-07-13 RX ADMIN — Medication 10 ML: at 06:37

## 2021-07-13 RX ADMIN — VERAPAMIL HYDROCHLORIDE 240 MG: 120 TABLET, FILM COATED, EXTENDED RELEASE ORAL at 08:51

## 2021-07-13 RX ADMIN — PANTOPRAZOLE SODIUM 40 MG: 40 TABLET, DELAYED RELEASE ORAL at 06:37

## 2021-07-13 RX ADMIN — Medication 10 ML: at 13:47

## 2021-07-13 RX ADMIN — INSULIN LISPRO 2 UNITS: 100 INJECTION, SOLUTION INTRAVENOUS; SUBCUTANEOUS at 11:51

## 2021-07-13 RX ADMIN — HEPARIN SODIUM 5000 UNITS: 5000 INJECTION INTRAVENOUS; SUBCUTANEOUS at 02:19

## 2021-07-13 RX ADMIN — Medication 1 CAPSULE: at 08:51

## 2021-07-13 RX ADMIN — POTASSIUM BICARBONATE 40 MEQ: 782 TABLET, EFFERVESCENT ORAL at 08:50

## 2021-07-13 RX ADMIN — RISPERIDONE 3 MG: 1 TABLET ORAL at 08:51

## 2021-07-13 NOTE — PROGRESS NOTES
Problem: Mobility Impaired (Adult and Pediatric)  Goal: *Acute Goals and Plan of Care (Insert Text)  Description: FUNCTIONAL STATUS PRIOR TO ADMISSION: Patient deemed a poor historian at the time of eval. She reported that she was independent and active without use of DME.    HOME SUPPORT PRIOR TO ADMISSION: The patient lived alone and has a son locally. It is unclear how much support he is able to provide upon discharge. Marco Antonio Records Physical Therapy Goals  Initiated 7/1/2021; reviewed/ continued 7/13/21  1. Patient will move from supine to sit and sit to supine , scoot up and down, and roll side to side in bed with independence within 7 day(s). MET 7/13/21  2. Patient will transfer from bed to chair and chair to bed with independence using the least restrictive device within 7 day(s). 3.  Patient will perform sit to stand with independence within 7 day(s). MET 7/13/21  4. Patient will ambulate with independence for 300 feet with the least restrictive device within 7 day(s). 5.  Patient will ascend/descend 12 stairs with one handrail(s) with modified independence within 7 day(s). 7/13/2021 1106 by Vinny Lipscomb PT  Outcome: Progressing Towards Goal     PHYSICAL THERAPY TREATMENT: WEEKLY REASSESSMENT  Patient: Kishan Godoy (89 y.o. female)  Date: 7/13/2021  Primary Diagnosis: Sepsis ([facility] [address] [A41.9]        Precautions:   Fall, Bed Alarm      ASSESSMENT  Patient continues with skilled PT services and is progressing towards goals. Pt initially refusing therapy this date; agreed to ambulate after discussion with CM who provided additional education and encouragement. Pt tolerated amb on unit with SBA for safety due to minor gait deviations, no LOB. Pt refused stair training this date; poor response to education/ encouragement. Recommend family support at d/c and follow up [facility] PT.     Patient's progression toward goals since last assessment: Poor participation in therapy due to pt refusal    Current Level of Function Impacting Discharge (mobility/balance): bed mob indep, sit to stand indep, amb SBA    Other factors to consider for discharge: lives alone with supportive local son         PLAN :  Goals have been updated based on progression since last assessment. Patient continues to benefit from skilled intervention to address the above impairments. Recommendations and Planned Interventions: gait training, therapeutic exercises, patient and family training/education, and therapeutic activities      Frequency/Duration: Patient will be followed by physical therapy:  3 times a week to address goals. Recommendation for discharge: (in order for the patient to meet his/her long term goals)  Physical therapy at least 2 days/week in the home , family support    This discharge recommendation:  Has been made in collaboration with the attending provider and/or case management    IF patient discharges home will need the following DME: none         SUBJECTIVE:   Patient stated Stop asking me about my house, I'm not doing any stairs.     OBJECTIVE DATA SUMMARY:   HISTORY:    Past Medical History:   Diagnosis Date    Cancer ([facility] [address] 2010, 2015    BREAST right X2    Diabetes ([facility] [address]     TYPE II    Headache     AWOKE WITH HEADACHE TODAY; HAS HEADACHE ALMOST EVERY DAY, SHE STATES; RELIEVED BY TYLENOL.     Hypertension     Parkinsonism ([facility] [address]     Psychiatric disorder     TAKES RISPERIDONE, PT STATES, FOR \"MOOD\"    PUD (peptic ulcer disease)     peptic ulcer; no bleeding    Shortness of breath      Past Surgical History:   Procedure Laterality Date    HX BREAST LUMPECTOMY Left 2010, 2016     BREAST    HX BREAST RECONSTRUCTION Left 6/8/2016    REMOVE TISSUE EXPANDER,PLACEMENT OF IMPLANT LEFT BREAST,REVISE LEFT RECONSTRUCTED BREAST,LIPOSUCTION OF LEFT BREAST, RIGHT MASTOPEXY FOR SYMMETRY performed by Nini Sales MD at [address] Left 10/5/2016    LEFT NIPPLE [address] WITH LEFT GROIN SKIN GRAFT, EXCISION TISSUE LEFT BREAST  performed by Tish Dave MD at [facility]    HX HYSTERECTOMY      TOTAL    HX OTHER SURGICAL      SEBACEOUS CYST REMOVAL UPPER BACK       Personal factors and/or comorbidities impacting plan of care: Parkinsons, mood disorder    Home Situation  Home Environment: Private residence  # Steps to Enter:  (patient does not know)  One/Two Story Residence: Two story  # of Interior Steps:  (12?)  Living Alone: Yes  Support Systems: Child(ruiz)  Patient Expects to be Discharged to[de-identified] House  Current DME Used/Available at Home: Grab bars, Walker  Tub or Shower Type: Shower    EXAMINATION/PRESENTATION/DECISION MAKING:   Critical Behavior:  Neurologic State: Alert  Orientation Level: Oriented X4  Cognition: Follows commands  Safety/Judgement: Awareness of environment  Hearing:   Auditory  Auditory Impairment: None  Range Of Motion:  AROM: Generally decreased, functional                       Strength:    Strength: Generally decreased, functional                    Tone & Sensation:   Tone: Normal              Sensation: Intact               Coordination:  Coordination: Within functional limits  Vision:      Functional Mobility:  Bed Mobility:     Supine to Sit: Independent  Sit to Supine: Independent     Transfers:  Sit to Stand: Independent  Stand to Sit: Independent                       Balance:   Sitting: Intact  Standing: Impaired  Standing - Static: Good  Standing - Dynamic : Fair  Ambulation/Gait Training:  Distance (ft): 200 Feet (ft)  Assistive Device: Gait belt  Ambulation - Level of Assistance: Stand-by assistance        Gait Abnormalities: Decreased step clearance;Trunk sway increased        Base of Support: Widened        Step Length: Left shortened;Right shortened          Stairs:      Pt refused        Pain Rating:  No c/o pain    Activity Tolerance:   Good    After treatment patient left in no apparent distress:   Supine in bed, Call bell within reach, and Side rails x 3    COMMUNICATION/EDUCATION:   The patients plan of care was discussed with: Registered nurse and Case management. Fall prevention education was provided and the patient/caregiver indicated understanding., Patient/family have participated as able in goal setting and plan of care. , and Patient/family agree to work toward stated goals and plan of care.     Thank you for this referral.  Claude Hobby, PT   Time Calculation: 22 mins

## 2021-07-13 NOTE — PROGRESS NOTES
Problem: Falls - Risk of  Goal: *Absence of Falls  Description: Document Debbie Badillo Fall Risk and appropriate interventions in the flowsheet. 7/13/2021 1610 by Errol Lemus RN  Outcome: Resolved/Met  7/13/2021 1610 by Errol Lemus RN  Outcome: Resolved/Met     Problem: Patient Education: Go to Patient Education Activity  Goal: Patient/Family Education  7/13/2021 1610 by Errol Lemus RN  Outcome: Resolved/Met  7/13/2021 1610 by Errol Lemus RN  Outcome: Resolved/Met     Problem: Patient Education: Go to Patient Education Activity  Goal: Patient/Family Education  7/13/2021 1610 by Errol Lemus RN  Outcome: Resolved/Met  7/13/2021 1610 by Errol Lemus RN  Outcome: Resolved/Met     Problem: Patient Education: Go to Patient Education Activity  Goal: Patient/Family Education  7/13/2021 1610 by Errol Lemus RN  Outcome: Resolved/Met  7/13/2021 1610 by Errol Lemus RN  Outcome: Resolved/Met     Problem: Diabetes Self-Management  Goal: *Disease process and treatment process  Description: Define diabetes and identify own type of diabetes; list 3 options for treating diabetes. 7/13/2021 1610 by Errol Lemus RN  Outcome: Resolved/Met  7/13/2021 1610 by Errol Lemus RN  Outcome: Resolved/Met  Goal: *Incorporating nutritional management into lifestyle  Description: Describe effect of type, amount and timing of food on blood glucose; list 3 methods for planning meals. 7/13/2021 1610 by Errol Lemus RN  Outcome: Resolved/Met  7/13/2021 1610 by Errol Lemus RN  Outcome: Resolved/Met  Goal: *Incorporating physical activity into lifestyle  Description: State effect of exercise on blood glucose levels.   7/13/2021 1610 by Errol Lemus RN  Outcome: Resolved/Met  7/13/2021 1610 by Errol Lemus RN  Outcome: Resolved/Met  Goal: *Developing strategies to promote health/change behavior  Description: Define the ABC's of diabetes; identify appropriate screenings, schedule and personal plan for screenings. 7/13/2021 1610 by Theresa Jamil RN  Outcome: Resolved/Met  7/13/2021 1610 by Theresa Jamil RN  Outcome: Resolved/Met  Goal: *Using medications safely  Description: State effect of diabetes medications on diabetes; name diabetes medication taking, action and side effects. 7/13/2021 1610 by Theresa Jamil RN  Outcome: Resolved/Met  7/13/2021 1610 by Theresa Jamil RN  Outcome: Resolved/Met  Goal: *Monitoring blood glucose, interpreting and using results  Description: Identify recommended blood glucose targets  and personal targets. 7/13/2021 1610 by Theresa Jamil RN  Outcome: Resolved/Met  7/13/2021 1610 by Theresa Jamil RN  Outcome: Resolved/Met  Goal: *Prevention, detection, treatment of acute complications  Description: List symptoms of hyper- and hypoglycemia; describe how to treat low blood sugar and actions for lowering  high blood glucose level. 7/13/2021 1610 by Thersea Jamil RN  Outcome: Resolved/Met  7/13/2021 1610 by Theresa Jamil RN  Outcome: Resolved/Met  Goal: *Prevention, detection and treatment of chronic complications  Description: Define the natural course of diabetes and describe the relationship of blood glucose levels to long term complications of diabetes.   7/13/2021 1610 by Theresa Jamil RN  Outcome: Resolved/Met  7/13/2021 1610 by Theresa Jamil RN  Outcome: Resolved/Met  Goal: *Developing strategies to address psychosocial issues  Description: Describe feelings about living with diabetes; identify support needed and support network  7/13/2021 1610 by Theresa Jamil RN  Outcome: Resolved/Met  7/13/2021 1610 by Theresa Jamil RN  Outcome: Resolved/Met  Goal: *Insulin pump training  7/13/2021 1610 by Theresa Jamil RN  Outcome: Resolved/Met  7/13/2021 1610 by Theresa Jamil RN  Outcome: Resolved/Met  Goal: *Sick day guidelines  7/13/2021 1610 by Jazmyn Escalona RN  Outcome: Resolved/Met  7/13/2021 1610 by Jazmyn Escalona RN  Outcome: Resolved/Met  Goal: *Patient Specific Goal (EDIT GOAL, INSERT TEXT)  7/13/2021 1610 by Jazmyn Escalona RN  Outcome: Resolved/Met  7/13/2021 1610 by Jazmyn Escalona RN  Outcome: Resolved/Met     Problem: Patient Education: Go to Patient Education Activity  Goal: Patient/Family Education  7/13/2021 1610 by Jazmyn Escalona RN  Outcome: Resolved/Met  7/13/2021 1610 by Jazmyn Escalona RN  Outcome: Resolved/Met     Problem: Pressure Injury - Risk of  Goal: *Prevention of pressure injury  Description: Document Josué Scale and appropriate interventions in the flowsheet.   Outcome: Resolved/Met     Problem: Patient Education: Go to Patient Education Activity  Goal: Patient/Family Education  Outcome: Resolved/Met     Problem: Breathing Pattern - Ineffective  Goal: *Absence of hypoxia  Outcome: Resolved/Met  Goal: *Use of effective breathing techniques  Outcome: Resolved/Met  Goal: *PALLIATIVE CARE:  Alleviation of Dyspnea  Outcome: Resolved/Met     Problem: Patient Education: Go to Patient Education Activity  Goal: Patient/Family Education  Outcome: Resolved/Met

## 2021-07-13 NOTE — DISCHARGE SUMMARY
Discharge Summary       PATIENT ID: Pedro Nieves  MRN: [id number]   YOB: 1955    DATE OF ADMISSION: 6/30/2021  6:34 PM    DATE OF DISCHARGE: 7/13/21   PRIMARY CARE PROVIDER: Ofelia Moe MD     ATTENDING PHYSICIAN: Maureen Brown  DISCHARGING PROVIDER: Davi Giron MD    To contact this individual call [phone] and ask the  to page. If unavailable ask to be transferred the Adult Hospitalist Department. CONSULTATIONS: IP CONSULT TO HOSPITALIST  IP CONSULT TO INFECTIOUS DISEASES  IP CONSULT TO ONCOLOGY    PROCEDURES/SURGERIES: * No surgery found *    ADMITTING [address] COURSE:   Antonia Arroyo a 74 y. o. female with hx of NIDDM II, Dyslipidemia, Parkinson's, Mood disorder, PUD who presented to ed for confusion and poor po intake.  Patient is seen and examined at bedside. Lasha Castle is alert and oriented to person and place but still mildly confused and provides limited history.  States she has had persistent nausea over the last few days as well as lower abdominal pain.  Per chart review, son reported that patient over the last 3 days has become increasingly confused.   The patient denies any fever, chills, chest pain, cough, congestion, recent illness, palpitations, or dysuria.         Assessment & Plan:      Severe sepsis secondary to acute cystitis POA  -iv rocephin switched to IV zosyn on 7/5  Per c/s completed   - wbc scan unremarkable f/u with hematology as out pt     T2DM well controlled   -A1c 6.8  -continue lantus 15 units, sliding scale and monitor finger stick glucose      AMANDEEP- resolved      Anemia likely due to chronic disease   -stable, Hgb 8.7  -monitor H/H     Hypokalemia  -replaced and resolved     HTN  -BP not at goal, continue verapamil , monitor BP add second agent      Mood disorder   -stable, continue on cogentin and risperidone     Dyslipidemia   -stable, continue lipitor     Hx of PUD  -stable, continue protonix     Acute metabolic encephalopathy resolved aao x3      Hx of Parkinson's disease   -not on medication  -PT/OT                DISCHARGE DIAGNOSES / PLAN:      1. D/c home     ADDITIONAL CARE RECOMMENDATIONS:        PENDING TEST RESULTS:   At the time of discharge the following test results are still pending:     FOLLOW UP APPOINTMENTS:    Follow-up Information     Follow up With Specialties Details Why [address]   [address], [address]    Marilin Sanchez MD Family Medicine In 1 week  [address] [phone]  Fern Awan MD Hematology and Oncology In 2 weeks  [address] [address] [phone]               DIET: Regular Diet and Cardiac Diet    ACTIVITY: Activity as tolerated    WOUND CARE:     EQUIPMENT needed:       DISCHARGE MEDICATIONS:  Current Discharge Medication List      CONTINUE these medications which have NOT CHANGED    Details   lovastatin (MEVACOR) 20 mg tablet Take 20 mg by mouth nightly. risperiDONE (RISPERDAL) 3 mg tablet Take 3 mg by mouth daily. indapamide (LOZOL) 1.25 mg tablet Take 1.25 mg by mouth daily. omeprazole (PRILOSEC) 20 mg capsule Take 20 mg by mouth daily. verapamil ER (CALAN-SR) 240 mg CR tablet Take 240 mg by mouth daily. benztropine (COGENTIN) 0.5 mg tablet Take 0.5 mg by mouth two (2) times a day. Indications: a type of movement disorder called parkinsonism      glimepiride (AMARYL) 2 mg tablet Take 2 mg by mouth two (2) times a day. NOTIFY YOUR PHYSICIAN FOR ANY OF THE FOLLOWING:   Fever over 101 degrees for 24 hours. Chest pain, shortness of breath, fever, chills, nausea, vomiting, diarrhea, change in mentation, falling, weakness, bleeding. Severe pain or pain not relieved by medications.   Or, any other signs or symptoms that you may have questions about.    DISPOSITION:  x  Home With:   OT  PT  HH  RN       Long term SNF/Inpatient Rehab    Independent/assisted living    Hospice    Other:       PATIENT CONDITION AT DISCHARGE:     Functional status    Poor    x Deconditioned     Independent      Cognition    x Lucid     Forgetful     Dementia      Catheters/lines (plus indication)    [place]     PICC     PEG    x None      Code status   x  Full code     DNR      PHYSICAL EXAMINATION AT DISCHARGE:  General:          Alert, cooperative, no distress, appears stated age. HEENT:           Atraumatic, anicteric sclerae, pink conjunctivae                          No oral ulcers, mucosa moist, throat clear, dentition fair  Neck:               Supple, symmetrical  Lungs:             Clear to auscultation bilaterally. No Wheezing or Rhonchi. No rales. Chest wall:      No tenderness  No Accessory muscle use. Heart:              Regular  rhythm,  No  murmur   No edema  Abdomen:        Soft, non-tender. Not distended. Bowel sounds normal  Extremities:     No cyanosis. No clubbing,                            Skin turgor normal, Capillary refill normal  Skin:                Not pale. Not Jaundiced  No rashes   Psych:             Not anxious or agitated.   Neurologic:      Alert, moves all extremities, answers questions appropriately and responds to commands       CHRONIC MEDICAL DIAGNOSES:  Problem List as of 7/13/2021 Date Reviewed: 10/5/2016        Codes Class Noted - Resolved    Absence of nipple, acquired ICD-10-CM: Z90.10  ICD-9-CM: V45.71  10/5/2016 - Present        History of breast cancer ICD-10-CM: Z85.3  ICD-9-CM: V10.3  6/8/2016 - Present        Deformity and disproportion of reconstructed breast ICD-10-CM: N65.0, N65.1  ICD-9-CM: 612.0, 612.1  6/8/2016 - Present        Ptosis of breast ICD-10-CM: N64.81  ICD-9-CM: 611.81  6/8/2016 - Present        RESOLVED: Sepsis ([email or web address] ICD-10-CM: A41.9  ICD-9-CM: 038.9, 995.91  6/30/2021 - 7/13/2021              Greater than 30  minutes were spent with the patient on counseling and coordination of care    Signed:   Katheen Lanes, MD  7/13/2021  9:49 AM

## 2021-07-13 NOTE — DISCHARGE INSTRUCTIONS
Discharge Instructions       PATIENT ID: Anila James  MRN: [id number]   YOB: 1955    DATE OF ADMISSION: 6/30/2021  6:34 PM    DATE OF DISCHARGE: 7/13/2021    PRIMARY CARE PROVIDER: Dona Samaniego MD     ATTENDING PHYSICIAN: Morris Eldridge MD  DISCHARGING PROVIDER: Manny Chase MD    To contact this individual call [phone] and ask the  to page. If unavailable ask to be transferred the Adult Hospitalist Department. DISCHARGE DIAGNOSES Sepsis from UTI    CONSULTATIONS: IP CONSULT TO HOSPITALIST  IP CONSULT TO INFECTIOUS DISEASES  IP CONSULT TO ONCOLOGY    PROCEDURES/SURGERIES: * No surgery found *    PENDING TEST RESULTS:   At the time of discharge the following test results are still pending:     FOLLOW UP APPOINTMENTS:   Follow-up Information     Follow up With Specialties Details Why [address], [address] [facility] [address]    Dona Samaniego MD Family Medicine In 1 week  [address] [facility] [phone]  Cliff Calderon MD Hematology and Oncology In 2 weeks  [address] [address]  [phone]             ADDITIONAL CARE RECOMMENDATIONS:     DIET: Regular Diet and Cardiac Diet    ACTIVITY: Activity as tolerated    WOUND CARE:     EQUIPMENT needed:       Radiology      DISCHARGE MEDICATIONS:   See Medication Reconciliation Form    · It is important that you take the medication exactly as they are prescribed. · Keep your medication in the bottles provided by the pharmacist and keep a list of the medication names, dosages, and times to be taken in your wallet. · Do not take other medications without consulting your doctor. NOTIFY YOUR PHYSICIAN FOR ANY OF THE FOLLOWING:   Fever over 101 degrees for 24 hours.    Chest pain, shortness of breath, fever, chills, nausea, vomiting, diarrhea, change in mentation, falling, weakness, bleeding. Severe pain or pain not relieved by medications. Or, any other signs or symptoms that you may have questions about.       DISPOSITION:   x Home With:   OT  PT  HH  RN       SNF/Inpatient Rehab/LTAC    Independent/assisted living    Hospice    Other:     CDMP Checked:   Yes x     PROBLEM LIST Updated:  Yes x       Signed:   Elva Tafoya MD  7/13/2021  9:48 AM

## 2021-07-13 NOTE — PROGRESS NOTES
Patient refused 0900 Zosyn dose and stated that she no longer wanted the Effer-K tablets after taking the 0900 dose. MD has been notified.

## 2021-07-13 NOTE — PROGRESS NOTES
Attempted to schedule hospital follow up PCP appointment. Awaiting call back from the office with appointment information. Pending patient discharge. Rian Primrose, Care Management Specialist.     Received call back from office. Office nurse will contact the patient with appointment information.   Rian Primrose, Care Management Specialist.

## 2021-07-13 NOTE — PROGRESS NOTES
I have reviewed discharge instructions with the patient and son. Questions and concerns addressed. The patient and son verbalized understanding. IV access removed. Patient discharged via private vehicle. Patient's signature placed on chart for discharge paperwork dt signature pad not working.

## 2021-07-13 NOTE — PROGRESS NOTES
FREDY:  RUR: 15%     Disposition:  Home with Home Health PT/OT [facility])    CM noted orders for patient discharge today. CM notifying [facility] of d/c orders. Patient's son to assist with d/c transportation. Medicare pt has received, reviewed, and signed 2nd IM letter informing them of their right to appeal the discharge. Signed copied has been placed on pt bedside chart.     Ericka Hand, [address], [address]

## 2021-07-13 NOTE — PROGRESS NOTES
Physical Therapy    Attempted PT treatment. Pt refused all activity. Provided education on benefit of mobilization to prevent functional decline, assist with d/c planning. Pt continued to refuse participation.      Espinoza Kelley, PT, MPT

## 2021-07-14 ENCOUNTER — PATIENT OUTREACH (OUTPATIENT)
Dept: CASE MANAGEMENT | Age: 66
End: 2021-07-14

## 2021-07-14 NOTE — PROGRESS NOTES
21     Care Transitions Initial Call    Call within 2 business days of discharge: Yes     Patient: Tatiana Leal Patient : 1955 MRN: [id number]    Last Discharge [address]       Complaint Diagnosis Description Type Department Provider    21 Altered mental status Severe sepsis ([facility] [address] . .. ED to Hosp-Admission (Discharged) (ADMIT) Zay Hadley MD; Aditi Godinez. .. Was this an external facility discharge? No     Care Transitions Outreach Attempt    Attempted to reach patient for transitions of care follow up. Unable to reach patient. Left message with call back number. Delisa Gonzalez DNP, FNP-C, Care Transitions Team, (Ph) [phone]     Attempted once more today to reach pt but she is not answering the phone at this time. Left message re-introducing myself and left my call back phone number.     Delisa Gonzalez DNP, FNP-C, Care Transitions Team, (Ph) [phone]

## 2021-07-16 LAB
BACKGROUND, CALR2T: NORMAL
BACKGROUND: 489207: NORMAL
CALR MUTATION DETECTION RESULT, CALR1T: NORMAL
DIRECTOR REVIEW: 489204: NORMAL
EXTRACTION: NORMAL
JAK2 P.V617F BLD/T QL: NORMAL
LAB DIRECTOR NAME PROVIDER: NORMAL
LAB DIRECTOR NAME PROVIDER: NORMAL
MPL GENE MUT TESTED BLD/T: NORMAL
MPL MUTATION ANALYSIS RESULT: NORMAL
REF LAB TEST METHOD: NORMAL
REFERENCES, CALR4T: NORMAL
REFERENCES, MPLM6T: NORMAL
REFLEX: NORMAL
SERVICE CMNT-IMP: NORMAL

## 2021-07-19 LAB
CELLS ANALYZED: 200
CELLS COUNTED: 200
DIRECTOR REVIEW: NORMAL
FISH RESULT, BCRF4T: NORMAL
INTERPRETATION: NORMAL
SPECIMEN TYPE: NORMAL

## 2021-07-22 ENCOUNTER — PATIENT OUTREACH (OUTPATIENT)
Dept: CASE MANAGEMENT | Age: 66
End: 2021-07-22

## 2021-07-22 NOTE — PROGRESS NOTES
07/22/21     Care Transitions Outreach Attempt    Attempted to reach patient for transitions of care follow up. Unable to reach patient. Left message re-introducing myself and the purpose of my call. Phone # left in message for pt's return call. Since this is my third attempt to reach pt, I will also send a letter today and close out this episode of care.     Saintclair Buerger, DNP, FNP-C, Care Transitions Team, (ph) [phone]

## 2023-05-20 RX ORDER — GLIMEPIRIDE 2 MG/1
2 TABLET ORAL 2 TIMES DAILY
COMMUNITY

## 2023-05-20 RX ORDER — OMEPRAZOLE 20 MG/1
20 CAPSULE, DELAYED RELEASE ORAL DAILY
COMMUNITY

## 2023-05-20 RX ORDER — BENZTROPINE MESYLATE 0.5 MG/1
0.5 TABLET ORAL 2 TIMES DAILY
COMMUNITY

## 2023-05-20 RX ORDER — RISPERIDONE 3 MG/1
3 TABLET ORAL DAILY
COMMUNITY

## 2023-05-20 RX ORDER — LOVASTATIN 20 MG/1
20 TABLET ORAL NIGHTLY
COMMUNITY

## 2023-05-20 RX ORDER — VERAPAMIL HYDROCHLORIDE 240 MG/1
240 TABLET, FILM COATED, EXTENDED RELEASE ORAL DAILY
COMMUNITY

## 2023-05-20 RX ORDER — INDAPAMIDE 1.25 MG/1
1.25 TABLET, FILM COATED ORAL DAILY
COMMUNITY

## 2024-05-24 ENCOUNTER — APPOINTMENT (OUTPATIENT)
Facility: HOSPITAL | Age: 69
End: 2024-05-24
Payer: MEDICARE

## 2024-05-24 ENCOUNTER — HOSPITAL ENCOUNTER (EMERGENCY)
Facility: HOSPITAL | Age: 69
Discharge: HOME | End: 2024-05-24
Attending: EMERGENCY MEDICINE
Payer: MEDICARE

## 2024-05-24 VITALS
OXYGEN SATURATION: 97 % | HEART RATE: 75 BPM | WEIGHT: 228.4 LBS | BODY MASS INDEX: 40.47 KG/M2 | DIASTOLIC BLOOD PRESSURE: 98 MMHG | HEIGHT: 63 IN | RESPIRATION RATE: 16 BRPM | TEMPERATURE: 98.5 F | SYSTOLIC BLOOD PRESSURE: 152 MMHG

## 2024-05-24 DIAGNOSIS — R07.9 CHEST PAIN, UNSPECIFIED TYPE: Primary | ICD-10-CM

## 2024-05-24 DIAGNOSIS — I10 UNCONTROLLED HYPERTENSION: ICD-10-CM

## 2024-05-24 LAB
ALBUMIN SERPL-MCNC: 3.9 G/DL (ref 3.5–5)
ALBUMIN/GLOB SERPL: 1 (ref 1.1–2.2)
ALP SERPL-CCNC: 86 U/L (ref 45–117)
ALT SERPL-CCNC: 23 U/L (ref 12–78)
ANION GAP SERPL CALC-SCNC: 11 MMOL/L (ref 5–15)
AST SERPL-CCNC: 19 U/L (ref 15–37)
BASOPHILS # BLD: 0 K/UL (ref 0–0.1)
BASOPHILS NFR BLD: 0 % (ref 0–1)
BILIRUB SERPL-MCNC: 0.3 MG/DL (ref 0.2–1)
BUN SERPL-MCNC: 15 MG/DL (ref 6–20)
BUN/CREAT SERPL: 12 (ref 12–20)
CALCIUM SERPL-MCNC: 9.8 MG/DL (ref 8.5–10.1)
CHLORIDE SERPL-SCNC: 100 MMOL/L (ref 97–108)
CO2 SERPL-SCNC: 27 MMOL/L (ref 21–32)
CREAT SERPL-MCNC: 1.29 MG/DL (ref 0.55–1.02)
D DIMER PPP FEU-MCNC: 0.83 MG/L FEU (ref 0–0.65)
DIFFERENTIAL METHOD BLD: ABNORMAL
EKG ATRIAL RATE: 94 BPM
EKG DIAGNOSIS: NORMAL
EKG P AXIS: 54 DEGREES
EKG P-R INTERVAL: 154 MS
EKG Q-T INTERVAL: 346 MS
EKG QRS DURATION: 74 MS
EKG QTC CALCULATION (BAZETT): 432 MS
EKG R AXIS: 0 DEGREES
EKG T AXIS: 2 DEGREES
EKG VENTRICULAR RATE: 94 BPM
EOSINOPHIL # BLD: 0 K/UL (ref 0–0.4)
EOSINOPHIL NFR BLD: 0 % (ref 0–7)
ERYTHROCYTE [DISTWIDTH] IN BLOOD BY AUTOMATED COUNT: 13.7 % (ref 11.5–14.5)
GLOBULIN SER CALC-MCNC: 4.1 G/DL (ref 2–4)
GLUCOSE BLD STRIP.AUTO-MCNC: 262 MG/DL (ref 65–117)
GLUCOSE SERPL-MCNC: 288 MG/DL (ref 65–100)
HCT VFR BLD AUTO: 38.2 % (ref 35–47)
HGB BLD-MCNC: 12.4 G/DL (ref 11.5–16)
IMM GRANULOCYTES # BLD AUTO: 0 K/UL (ref 0–0.04)
IMM GRANULOCYTES NFR BLD AUTO: 0 % (ref 0–0.5)
LYMPHOCYTES # BLD: 1.7 K/UL (ref 0.8–3.5)
LYMPHOCYTES NFR BLD: 17 % (ref 12–49)
MCH RBC QN AUTO: 27.2 PG (ref 26–34)
MCHC RBC AUTO-ENTMCNC: 32.5 G/DL (ref 30–36.5)
MCV RBC AUTO: 83.8 FL (ref 80–99)
MONOCYTES # BLD: 0.6 K/UL (ref 0–1)
MONOCYTES NFR BLD: 6 % (ref 5–13)
NEUTS SEG # BLD: 7.6 K/UL (ref 1.8–8)
NEUTS SEG NFR BLD: 77 % (ref 32–75)
NRBC # BLD: 0 K/UL (ref 0–0.01)
NRBC BLD-RTO: 0 PER 100 WBC
NT PRO BNP: 74 PG/ML (ref 0–125)
PLATELET # BLD AUTO: 349 K/UL (ref 150–400)
PMV BLD AUTO: 9.7 FL (ref 8.9–12.9)
POTASSIUM SERPL-SCNC: 3.9 MMOL/L (ref 3.5–5.1)
PROT SERPL-MCNC: 8 G/DL (ref 6.4–8.2)
RBC # BLD AUTO: 4.56 M/UL (ref 3.8–5.2)
SERVICE CMNT-IMP: ABNORMAL
SODIUM SERPL-SCNC: 138 MMOL/L (ref 136–145)
TROPONIN I SERPL HS-MCNC: 7 NG/L (ref 0–51)
TROPONIN I SERPL HS-MCNC: 8 NG/L (ref 0–51)
WBC # BLD AUTO: 9.9 K/UL (ref 3.6–11)

## 2024-05-24 PROCEDURE — 93010 ELECTROCARDIOGRAM REPORT: CPT | Performed by: INTERNAL MEDICINE

## 2024-05-24 PROCEDURE — 83880 ASSAY OF NATRIURETIC PEPTIDE: CPT

## 2024-05-24 PROCEDURE — 85025 COMPLETE CBC W/AUTO DIFF WBC: CPT

## 2024-05-24 PROCEDURE — 71046 X-RAY EXAM CHEST 2 VIEWS: CPT

## 2024-05-24 PROCEDURE — 84484 ASSAY OF TROPONIN QUANT: CPT

## 2024-05-24 PROCEDURE — 80053 COMPREHEN METABOLIC PANEL: CPT

## 2024-05-24 PROCEDURE — 99285 EMERGENCY DEPT VISIT HI MDM: CPT

## 2024-05-24 PROCEDURE — 6370000000 HC RX 637 (ALT 250 FOR IP): Performed by: EMERGENCY MEDICINE

## 2024-05-24 PROCEDURE — 36415 COLL VENOUS BLD VENIPUNCTURE: CPT

## 2024-05-24 PROCEDURE — 82962 GLUCOSE BLOOD TEST: CPT

## 2024-05-24 PROCEDURE — 6360000004 HC RX CONTRAST MEDICATION: Performed by: EMERGENCY MEDICINE

## 2024-05-24 PROCEDURE — 93005 ELECTROCARDIOGRAM TRACING: CPT | Performed by: EMERGENCY MEDICINE

## 2024-05-24 PROCEDURE — 71275 CT ANGIOGRAPHY CHEST: CPT

## 2024-05-24 PROCEDURE — 85379 FIBRIN DEGRADATION QUANT: CPT

## 2024-05-24 RX ORDER — NITROGLYCERIN 0.4 MG/1
0.4 TABLET SUBLINGUAL ONCE
Status: COMPLETED | OUTPATIENT
Start: 2024-05-24 | End: 2024-05-24

## 2024-05-24 RX ADMIN — NITROGLYCERIN 1 INCH: 20 OINTMENT TOPICAL at 11:06

## 2024-05-24 RX ADMIN — IOPAMIDOL 80 ML: 755 INJECTION, SOLUTION INTRAVENOUS at 12:22

## 2024-05-24 RX ADMIN — NITROGLYCERIN 0.4 MG: 0.4 TABLET SUBLINGUAL at 11:05

## 2024-05-24 ASSESSMENT — PAIN SCALES - GENERAL
PAINLEVEL_OUTOF10: 2
PAINLEVEL_OUTOF10: 6
PAINLEVEL_OUTOF10: 5
PAINLEVEL_OUTOF10: 6
PAINLEVEL_OUTOF10: 2

## 2024-05-24 ASSESSMENT — ENCOUNTER SYMPTOMS
SORE THROAT: 0
BACK PAIN: 0
EYE PAIN: 0
SHORTNESS OF BREATH: 0
ABDOMINAL PAIN: 0
CHEST TIGHTNESS: 1
VOMITING: 0

## 2024-05-24 ASSESSMENT — PAIN DESCRIPTION - ORIENTATION
ORIENTATION: RIGHT
ORIENTATION: RIGHT

## 2024-05-24 ASSESSMENT — PAIN DESCRIPTION - LOCATION
LOCATION: CHEST
LOCATION: CHEST
LOCATION: HEAD
LOCATION: CHEST
LOCATION: CHEST

## 2024-05-24 ASSESSMENT — PAIN - FUNCTIONAL ASSESSMENT: PAIN_FUNCTIONAL_ASSESSMENT: 0-10

## 2024-05-24 NOTE — ED PROVIDER NOTES
[facility] EM[facility]GENCY CTR  EMERGENCY DEPARTMENT ENCOUNTER      Pt Name: Hodan Angel  MRN: [id number]  Birthdate 1955  Date of evaluation: 5/24/2024  Provider: Miles Melgar MD      HISTORY OF PRESENT ILLNESS      69-year-old -American female with history of diabetes, hypertension, presenting to the ER for chest pain.  Patient reports she has had some right-sided chest pain intermittently since Tuesday.  It is worse if she pushes on the area or does any activity or movement.  She denies any prior history of a heart attack.  She does not feel short of breath.  No nausea vomiting or abdominal pain.  She has not had any medications today for her symptoms.              Nursing Notes were reviewed.    REVIEW OF SYSTEMS         Review of Systems   Constitutional:  Negative for chills and fever.   HENT:  Negative for congestion and sore throat.    Eyes:  Negative for pain and visual disturbance.   Respiratory:  Positive for chest tightness. Negative for shortness of breath.    Cardiovascular:  Positive for chest pain. Negative for leg swelling.   Gastrointestinal:  Negative for abdominal pain and vomiting.   Genitourinary:  Negative for dysuria.   Musculoskeletal:  Negative for back pain.   Skin:  Negative for rash.   Neurological:  Negative for weakness and headaches.   All other systems reviewed and are negative.          PAST MEDICAL HISTORY     Past Medical History:   Diagnosis Date    Cancer (HCC) 2010, 2015    BREAST right X2    Diabetes (HCC)     TYPE II    Headache     AWOKE WITH HEADACHE TODAY; HAS HEADACHE ALMOST EVERY DAY, SHE STATES; RELIEVED BY TYLENOL.    Hypertension     Parkinsonism (HCC)     Psychiatric disorder     TAKES RISPERIDONE, PT STATES, FOR \"MOOD\"    PUD (peptic ulcer disease)     peptic ulcer; no bleeding    Shortness of breath          SURGICAL HISTORY       Past Surgical History:   Procedure Laterality Date    BREAST LUMPECTOMY Left 2010, 2016     BREAST    BREAST

## 2024-05-24 NOTE — ED TRIAGE NOTES
Chest pain onset Tuesday, deep radiating pain 6/10. Denies associated SOB, n/v. Increased pain on activity.     Hx: HTN, breast CA